# Patient Record
Sex: FEMALE | Race: WHITE | NOT HISPANIC OR LATINO
[De-identification: names, ages, dates, MRNs, and addresses within clinical notes are randomized per-mention and may not be internally consistent; named-entity substitution may affect disease eponyms.]

---

## 2023-05-11 ENCOUNTER — APPOINTMENT (OUTPATIENT)
Dept: INTERNAL MEDICINE | Facility: CLINIC | Age: 68
End: 2023-05-11
Payer: MEDICAID

## 2023-05-11 ENCOUNTER — LABORATORY RESULT (OUTPATIENT)
Age: 68
End: 2023-05-11

## 2023-05-11 ENCOUNTER — NON-APPOINTMENT (OUTPATIENT)
Age: 68
End: 2023-05-11

## 2023-05-11 VITALS
DIASTOLIC BLOOD PRESSURE: 78 MMHG | OXYGEN SATURATION: 98 % | TEMPERATURE: 97.5 F | SYSTOLIC BLOOD PRESSURE: 126 MMHG | HEART RATE: 75 BPM | WEIGHT: 184 LBS | HEIGHT: 62 IN | BODY MASS INDEX: 33.86 KG/M2 | RESPIRATION RATE: 14 BRPM

## 2023-05-11 DIAGNOSIS — Q25.40 CONGENITAL MALFORMATION OF AORTA UNSPECIFIED: ICD-10-CM

## 2023-05-11 DIAGNOSIS — Z80.3 FAMILY HISTORY OF MALIGNANT NEOPLASM OF BREAST: ICD-10-CM

## 2023-05-11 DIAGNOSIS — Z83.3 FAMILY HISTORY OF DIABETES MELLITUS: ICD-10-CM

## 2023-05-11 DIAGNOSIS — Z00.00 ENCOUNTER FOR GENERAL ADULT MEDICAL EXAMINATION W/OUT ABNORMAL FINDINGS: ICD-10-CM

## 2023-05-11 PROCEDURE — 93000 ELECTROCARDIOGRAM COMPLETE: CPT | Mod: 59

## 2023-05-11 PROCEDURE — G0439: CPT

## 2023-05-11 RX ORDER — PIROXICAM 20 MG/1
20 CAPSULE ORAL DAILY
Qty: 30 | Refills: 3 | Status: ACTIVE | COMMUNITY
Start: 1900-01-01 | End: 1900-01-01

## 2023-05-11 RX ORDER — ASPIRIN 81 MG
81 TABLET, DELAYED RELEASE (ENTERIC COATED) ORAL DAILY
Refills: 0 | Status: ACTIVE | COMMUNITY

## 2023-05-11 NOTE — HISTORY OF PRESENT ILLNESS
[FreeTextEntry1] : 66 yo Welsh non-English speaking female accompanied by her bilingual daughter who acted as a  and historian.\par They have brought multiple medications prescribed in [place]. Pt will now be living with her daughter in [place].\par She is feeling well.\par Received one dose of Astrozeneca covid vaccine and suffered PE. [de-identified] : Never a smoker. No alcohol.\par Last mammogram about 10 years ago. Hasn't had a GYN check-up for many years.\par Hasn't had a colonoscopy.\par Doesn't exercise

## 2023-05-11 NOTE — HEALTH RISK ASSESSMENT
[Fair] : ~his/her~ current health as fair  [Good] : ~his/her~  mood as  good [No] : No [Never] : Never [de-identified] : Doesn't exercise

## 2023-05-11 NOTE — PHYSICAL EXAM
[No Acute Distress] : no acute distress [Well Nourished] : well nourished [Well Developed] : well developed [Well-Appearing] : well-appearing [Normal Sclera/Conjunctiva] : normal sclera/conjunctiva [PERRL] : pupils equal round and reactive to light [EOMI] : extraocular movements intact [Normal Outer Ear/Nose] : the outer ears and nose were normal in appearance [Normal Oropharynx] : the oropharynx was normal [No JVD] : no jugular venous distention [No Lymphadenopathy] : no lymphadenopathy [Supple] : supple [No Respiratory Distress] : no respiratory distress  [Thyroid Normal, No Nodules] : the thyroid was normal and there were no nodules present [No Accessory Muscle Use] : no accessory muscle use [Clear to Auscultation] : lungs were clear to auscultation bilaterally [Normal Rate] : normal rate  [Regular Rhythm] : with a regular rhythm [Normal S1, S2] : normal S1 and S2 [No Murmur] : no murmur heard [No Carotid Bruits] : no carotid bruits [No Abdominal Bruit] : a ~M bruit was not heard ~T in the abdomen [No Varicosities] : no varicosities [Pedal Pulses Present] : the pedal pulses are present [No Edema] : there was no peripheral edema [No Palpable Aorta] : no palpable aorta [No Extremity Clubbing/Cyanosis] : no extremity clubbing/cyanosis [Non Tender] : non-tender [Soft] : abdomen soft [Non-distended] : non-distended [No Masses] : no abdominal mass palpated [No HSM] : no HSM [Normal Bowel Sounds] : normal bowel sounds [Normal Posterior Cervical Nodes] : no posterior cervical lymphadenopathy [Normal Anterior Cervical Nodes] : no anterior cervical lymphadenopathy [No CVA Tenderness] : no CVA  tenderness [No Spinal Tenderness] : no spinal tenderness [No Joint Swelling] : no joint swelling [Grossly Normal Strength/Tone] : grossly normal strength/tone [No Rash] : no rash [Coordination Grossly Intact] : coordination grossly intact [No Focal Deficits] : no focal deficits [Normal Gait] : normal gait [Deep Tendon Reflexes (DTR)] : deep tendon reflexes were 2+ and symmetric [Normal Affect] : the affect was normal [Normal Insight/Judgement] : insight and judgment were intact

## 2023-05-11 NOTE — PLAN
[FreeTextEntry1] : See Cardiology\par See Pulmonary\par See Endocrinology\par See GI\par See Orthopedics \par RTO 1 month\par Check labs\par Recommend mammogram and GYN check-up

## 2023-05-18 ENCOUNTER — APPOINTMENT (OUTPATIENT)
Dept: ORTHOPEDIC SURGERY | Facility: CLINIC | Age: 68
End: 2023-05-18
Payer: MEDICAID

## 2023-05-18 ENCOUNTER — NON-APPOINTMENT (OUTPATIENT)
Age: 68
End: 2023-05-18

## 2023-05-18 VITALS — WEIGHT: 184 LBS | HEIGHT: 62 IN | BODY MASS INDEX: 33.86 KG/M2

## 2023-05-18 PROCEDURE — 20610 DRAIN/INJ JOINT/BURSA W/O US: CPT | Mod: 50

## 2023-05-18 PROCEDURE — 99204 OFFICE O/P NEW MOD 45 MIN: CPT | Mod: 25

## 2023-05-18 PROCEDURE — 73564 X-RAY EXAM KNEE 4 OR MORE: CPT | Mod: 50

## 2023-05-18 NOTE — PROCEDURE
[de-identified] : 5/18/2023–bilateral knee injection - Steroid\par The risks, benefits, and alternatives of the injection were reviewed/discussed with the patient today in office and all of their questions were answered. The patient consented to the procedure. The inferolateral injection site was prepped with chloroprep.  Cold spray was utilized to numb the skin. Utilizing sterile technique, the knee was injected with 1 ml 40 mg methylprednisolone, 4 ml 1% Lidocaine, 5 mL 0.25% Bupivicaine. A sterile bandage was applied. The patient tolerated the procedure well and there were no complications.\par

## 2023-05-18 NOTE — HISTORY OF PRESENT ILLNESS
[de-identified] : 5/18/2023–patient presents with bilateral knee pain right worse than left for at least 3 to 4 years.  Pain has been interfering with her daily activities, stairs are very difficult, getting up from a chair is very difficult.  The pain is diffuse in both knees.  Does have buckling locking and swelling.  Ambulates with a cane due to the pain.  Takes Advil and Tylenol for the pain.  Tried physical therapy but he cannot tolerate it due to the pain.  Has not had any other treatments.  Denies allergies, is on aspirin, denies tobacco use.  Past medical significant for diabetes last A1c 6.0 on oral medications, asthma.  Of note patient is going back to [place] in June and will be coming back here in December.  States at that time should be interested in knee replacements.

## 2023-05-18 NOTE — PHYSICAL EXAM
[de-identified] : General Appearance: normal without acute distress\par Mental: Alert and oriented x 3\par Psych/affect: appropriate, cooperative\par Gait: Antalgic gait with cane\par \par Bilateral lower extremity\par Hip: [normal ROM without pain on IR/ER]\par \par Knee\par Inspection: no effusion\par Wounds: [none]\par Alignment: [neutral]\par Palpation: tender to palpation at [medial] and lateral joint line\par ROM active (in degrees): 0-[110] with crepitus/pain through the arc of motion\par Ligamentous laxity: Mild laxity with valgus stress\par Meniscal Test: Deferred, pain with any maneuver\par Muscle Test: good quad strength. [de-identified] : 5/18/2023–bilateral knee xrays, standing AP/Lateral and Merchant films, and 45 degree PA standing view are reviewed and demonstrate diffuse tricompartmental degenerative arthritis, medial joint space narrowing, marginal osteophytes, bone on bone with sclerosis, patellofemoral joint space narrowing

## 2023-05-18 NOTE — DISCUSSION/SUMMARY
[de-identified] : Bilateral knee osteoarthritis\par \par Extensive discussion of the natural history of this disease was had with the patient.  We discussed the treatment options ranging from conservative therapy which includes anti-inflammatories, steroid injections, physical therapy, weight loss, and activity modification.  We did discuss that the ultimate treatment for arthritis is a joint replacement.  Patient would like knee replacements when she returns from [place] in December, for now would like to try conservative treatment.\par -A prescription for meloxicam with the appropriate warnings for GI, cardiac, and renal side effects was given to the patient.  Patient was instructed not to use any other NSAIDs with this medication.\par -Patient elected for bilateral steroid injection today.\par Patient will follow-up as needed if the pain returns or does not improve.

## 2023-05-19 ENCOUNTER — APPOINTMENT (OUTPATIENT)
Dept: OPHTHALMOLOGY | Facility: CLINIC | Age: 68
End: 2023-05-19
Payer: MEDICAID

## 2023-05-19 ENCOUNTER — NON-APPOINTMENT (OUTPATIENT)
Age: 68
End: 2023-05-19

## 2023-05-19 PROCEDURE — 92004 COMPRE OPH EXAM NEW PT 1/>: CPT

## 2023-05-19 PROCEDURE — 76514 ECHO EXAM OF EYE THICKNESS: CPT

## 2023-05-19 PROCEDURE — 92133 CPTRZD OPH DX IMG PST SGM ON: CPT

## 2023-05-22 DIAGNOSIS — M25.561 PAIN IN RIGHT KNEE: ICD-10-CM

## 2023-05-22 DIAGNOSIS — M25.562 PAIN IN RIGHT KNEE: ICD-10-CM

## 2023-06-04 LAB
25(OH)D3 SERPL-MCNC: 29.3 NG/ML
ALBUMIN SERPL ELPH-MCNC: 4.4 G/DL
ALP BLD-CCNC: 65 U/L
ALT SERPL-CCNC: 12 U/L
ANION GAP SERPL CALC-SCNC: 12 MMOL/L
APPEARANCE: CLEAR
AST SERPL-CCNC: 16 U/L
BASOPHILS # BLD AUTO: 0.05 K/UL
BASOPHILS NFR BLD AUTO: 0.8 %
BILIRUB SERPL-MCNC: 0.8 MG/DL
BILIRUBIN URINE: NEGATIVE
BLOOD URINE: NEGATIVE
BUN SERPL-MCNC: 17 MG/DL
CALCIUM SERPL-MCNC: 9.6 MG/DL
CHLORIDE SERPL-SCNC: 103 MMOL/L
CHOLEST SERPL-MCNC: 141 MG/DL
CO2 SERPL-SCNC: 26 MMOL/L
COLOR: YELLOW
CREAT SERPL-MCNC: 0.83 MG/DL
EOSINOPHIL # BLD AUTO: 0.15 K/UL
EOSINOPHIL NFR BLD AUTO: 2.5 %
ESTIMATED AVERAGE GLUCOSE: 126 MG/DL
FOLATE SERPL-MCNC: 12.2 NG/ML
GLUCOSE QUALITATIVE U: >=1000 MG/DL
GLUCOSE SERPL-MCNC: 90 MG/DL
HBA1C MFR BLD HPLC: 6 %
HCT VFR BLD CALC: 37.1 %
HDLC SERPL-MCNC: 63 MG/DL
HGB BLD-MCNC: 11.3 G/DL
IMM GRANULOCYTES NFR BLD AUTO: 0.2 %
KETONES URINE: NEGATIVE MG/DL
LDLC SERPL CALC-MCNC: 64 MG/DL
LEUKOCYTE ESTERASE URINE: NEGATIVE
LYMPHOCYTES # BLD AUTO: 2.66 K/UL
LYMPHOCYTES NFR BLD AUTO: 43.6 %
MAN DIFF?: NORMAL
MCHC RBC-ENTMCNC: 20 PG
MCHC RBC-ENTMCNC: 30.5 GM/DL
MCV RBC AUTO: 65.8 FL
MONOCYTES # BLD AUTO: 0.7 K/UL
MONOCYTES NFR BLD AUTO: 11.5 %
NEUTROPHILS # BLD AUTO: 2.53 K/UL
NEUTROPHILS NFR BLD AUTO: 41.4 %
NITRITE URINE: NEGATIVE
NONHDLC SERPL-MCNC: 78 MG/DL
PH URINE: 6.5
PLATELET # BLD AUTO: 329 K/UL
POTASSIUM SERPL-SCNC: 5 MMOL/L
PROT SERPL-MCNC: 7.3 G/DL
PROTEIN URINE: NEGATIVE MG/DL
RBC # BLD: 5.64 M/UL
RBC # FLD: 22.7 %
SODIUM SERPL-SCNC: 142 MMOL/L
SPECIFIC GRAVITY URINE: 1.03
TRIGL SERPL-MCNC: 73 MG/DL
TSH SERPL-ACNC: 1.48 UIU/ML
UROBILINOGEN URINE: 0.2 MG/DL
VIT B12 SERPL-MCNC: 813 PG/ML
WBC # FLD AUTO: 6.1 K/UL

## 2023-06-08 ENCOUNTER — APPOINTMENT (OUTPATIENT)
Dept: OPHTHALMOLOGY | Facility: CLINIC | Age: 68
End: 2023-06-08
Payer: MEDICAID

## 2023-06-08 ENCOUNTER — NON-APPOINTMENT (OUTPATIENT)
Age: 68
End: 2023-06-08

## 2023-06-08 PROCEDURE — 92083 EXTENDED VISUAL FIELD XM: CPT

## 2023-06-08 PROCEDURE — 92012 INTRM OPH EXAM EST PATIENT: CPT

## 2023-06-13 ENCOUNTER — APPOINTMENT (OUTPATIENT)
Dept: INTERNAL MEDICINE | Facility: CLINIC | Age: 68
End: 2023-06-13
Payer: MEDICAID

## 2023-06-13 VITALS
BODY MASS INDEX: 34.23 KG/M2 | WEIGHT: 186 LBS | RESPIRATION RATE: 14 BRPM | SYSTOLIC BLOOD PRESSURE: 110 MMHG | OXYGEN SATURATION: 98 % | TEMPERATURE: 98.3 F | DIASTOLIC BLOOD PRESSURE: 74 MMHG | HEIGHT: 62 IN | HEART RATE: 58 BPM

## 2023-06-13 PROCEDURE — 99214 OFFICE O/P EST MOD 30 MIN: CPT

## 2023-06-13 NOTE — HISTORY OF PRESENT ILLNESS
[Family Member] : family member [FreeTextEntry1] : Here for follow-up\par Pt is accompanied by her daughter who acted as .\par  [de-identified] : Saw Orthopedist; may need knee replacements in the future\par Saw Ophthalmologist. Being treated for glaucoma and dry eyes\par Has appt with Cardiology, GI. Endocrinology

## 2023-06-13 NOTE — PLAN
[FreeTextEntry1] : Follow up with Ortho\par Has appts with GI, Endocrinology, Cardiology\par Pt will be going back to [place] for a while.\par Follow up here after returning from [place]

## 2023-06-18 LAB
HGB A MFR BLD: 90.9 %
HGB A2 MFR BLD: 2.6 %
HGB F MFR BLD: 6.5 %
HGB FRACT BLD-IMP: NORMAL

## 2023-06-23 ENCOUNTER — APPOINTMENT (OUTPATIENT)
Dept: CARDIOLOGY | Facility: CLINIC | Age: 68
End: 2023-06-23
Payer: MEDICAID

## 2023-06-23 ENCOUNTER — NON-APPOINTMENT (OUTPATIENT)
Age: 68
End: 2023-06-23

## 2023-06-23 VITALS
OXYGEN SATURATION: 98 % | RESPIRATION RATE: 15 BRPM | HEIGHT: 62 IN | SYSTOLIC BLOOD PRESSURE: 102 MMHG | BODY MASS INDEX: 33.13 KG/M2 | DIASTOLIC BLOOD PRESSURE: 68 MMHG | HEART RATE: 70 BPM | WEIGHT: 180 LBS

## 2023-06-23 DIAGNOSIS — I25.10 ATHEROSCLEROTIC HEART DISEASE OF NATIVE CORONARY ARTERY W/OUT ANGINA PECTORIS: ICD-10-CM

## 2023-06-23 DIAGNOSIS — R06.09 OTHER FORMS OF DYSPNEA: ICD-10-CM

## 2023-06-23 PROCEDURE — 99205 OFFICE O/P NEW HI 60 MIN: CPT | Mod: 25

## 2023-06-23 PROCEDURE — 93000 ELECTROCARDIOGRAM COMPLETE: CPT

## 2023-06-23 RX ORDER — EZETIMIBE 10 MG/1
10 TABLET ORAL
Qty: 90 | Refills: 2 | Status: DISCONTINUED | COMMUNITY
Start: 2023-06-23 | End: 2023-06-23

## 2023-06-23 NOTE — HISTORY OF PRESENT ILLNESS
[FreeTextEntry1] : Patient is a 68yo F with obesity, DM, HLD, CAD here for cardiac evaluation. States symptoms started after COVID vaccine. Was having CP/SOB at that time. ? had VTE at that time. NOt very active, liimted by knee pain. Gets some dyspnea on exertion. No CP/pressure. Patient denies PND/orthopnea/edema/palpitations/syncope/claudication. \par \par \par Here with family, originally from  [place], [place]. Lives with daughter.  passed few months ago and now moved to [place].

## 2023-06-23 NOTE — DISCUSSION/SUMMARY
[FreeTextEntry1] : Patient is a 68yo F with obesity, DM, HLD, CAD here for cardiac evaluation. \par -History somewhat unclear regarding if truly has CAD diagnosed at home in [place], on antianginal medication not available in [place]. DAughter states history that sounds more c/w VTE however. \par -HAs cardiac RF and as noted possilble CAD as well as dyspnea and recommend further diagnostic evaluation. Lipids and BP well controlled\par \par \par 1. Echo and 2 day pharm nuclear stress for reasons noted above. limited by knee and unable to walk on treadmill\par 2. Continue ASA for now. Change atorvastatin to 20mg qhs and dc ezetemibe\par 3. Continue torsemide as well for now. No edema on exam or signs CHF at this time\par 4. Recommend aggressive diet and lifestyle modifications \par 5. Diabetes management per PMD. Counselled on diet/weight loss \par 6. Follow up after testing  [EKG obtained to assist in diagnosis and management of assessed problem(s)] : EKG obtained to assist in diagnosis and management of assessed problem(s)

## 2023-06-23 NOTE — PHYSICAL EXAM
[Normal] : clear lung fields, good air entry, no respiratory distress [Soft] : abdomen soft [Non Tender] : non-tender [Moves all extremities] : moves all extremities [Alert and Oriented] : alert and oriented [Abnormal Gait] : abnormal gait [de-identified] : obese  [de-identified] : trace edema, varicosites noted

## 2023-06-27 ENCOUNTER — APPOINTMENT (OUTPATIENT)
Dept: GASTROENTEROLOGY | Facility: CLINIC | Age: 68
End: 2023-06-27
Payer: MEDICAID

## 2023-06-27 VITALS
WEIGHT: 179.2 LBS | DIASTOLIC BLOOD PRESSURE: 72 MMHG | BODY MASS INDEX: 32.97 KG/M2 | HEIGHT: 62 IN | TEMPERATURE: 97.1 F | OXYGEN SATURATION: 99 % | SYSTOLIC BLOOD PRESSURE: 120 MMHG | HEART RATE: 82 BPM

## 2023-06-27 DIAGNOSIS — I20.8 OTHER FORMS OF ANGINA PECTORIS: ICD-10-CM

## 2023-06-27 DIAGNOSIS — K21.9 GASTRO-ESOPHAGEAL REFLUX DISEASE W/OUT ESOPHAGITIS: ICD-10-CM

## 2023-06-27 DIAGNOSIS — K80.20 CALCULUS OF GALLBLADDER W/OUT CHOLECYSTITIS W/OUT OBSTRUCTION: ICD-10-CM

## 2023-06-27 DIAGNOSIS — D64.9 ANEMIA, UNSPECIFIED: ICD-10-CM

## 2023-06-27 DIAGNOSIS — R10.30 LOWER ABDOMINAL PAIN, UNSPECIFIED: ICD-10-CM

## 2023-06-27 DIAGNOSIS — R11.0 NAUSEA: ICD-10-CM

## 2023-06-27 DIAGNOSIS — R10.12 LEFT UPPER QUADRANT PAIN: ICD-10-CM

## 2023-06-27 DIAGNOSIS — R10.13 EPIGASTRIC PAIN: ICD-10-CM

## 2023-06-27 PROCEDURE — 99204 OFFICE O/P NEW MOD 45 MIN: CPT

## 2023-06-27 NOTE — ASSESSMENT
[FreeTextEntry1] : Impression: Chronic upper abdominal pain and nausea possible GERD, likely significant functional component.  Crampy lower abdominal pain most likely IBS.  Reportedly has a large solitary gallstone does not appear to be symptomatic.  None of her current medications have improved her symptoms.\par \par Plan: We will DC all current medications from [place].  Begin pantoprazole 40 mg daily, dicyclomine 20 mg 3 times daily, and begin amitriptyline 10 mg nightly for functional dyspepsia.  Can increase to 20 mg after a week or 2 if no help.  I do not agree with oral dissolution therapy for gallstone as described.  We will obtain sonogram for further information.  Large solitary gallstone with evidence of inflammation would warrant a cholecystectomy, otherwise, if asymptomatic, no action would be necessary.  Patient needs to have EGD and colonoscopy which will be arranged upon her return from [place]

## 2023-06-27 NOTE — HISTORY OF PRESENT ILLNESS
[FreeTextEntry1] : 67-year-old female with a history of type 2 diabetes, asthma and coronary artery disease here to establish care.  Patient is from [place], currently here living with daughter, returning to [place] for the next 6 months next week.  Daughter provides translation.  Patient has been having various GI symptoms for the past 1 year and managed by physicians in [place].  Her primary symptom is severe epigastric and left upper quadrant pain occurring mostly at night awakening her.  Last 30 minutes or so.  She also gets crampy lower abdominal pain off-and-on.  She has occasional nausea but no vomiting.  No dysphagia, odynophagia, early satiety, weight loss, change in bowel habits.  Has not had an endoscopy nor colonoscopy.  Had a sonogram last year which reportedly showed a very large gallstone.  Surgery was not recommended but she was started on an [place] brand of oral dissolution therapy.  She is also been on an [place] brand of dexlansoprazole, and [place] prokinetic that appears to be similar to domperidone which she uses only on occasion, and then [place] General antispasmodic.  She states that none of these medications have improved her symptoms.\par \par Patient also has a microcytic anemia most likely due to a thalassemia trait or other hemoglobinopathy.

## 2023-06-27 NOTE — PHYSICAL EXAM
[Normal] : heart rate was normal and rhythm regular, normal S1 and S2, no murmurs [Bowel Sounds] : normal bowel sounds [No Masses] : no abdominal mass palpated [Abdomen Soft] : soft [] : no hepatosplenomegaly [Diffuse] : diffusely [Epigastric] : in the epigastric area

## 2023-07-12 ENCOUNTER — RX RENEWAL (OUTPATIENT)
Age: 68
End: 2023-07-12

## 2023-07-26 ENCOUNTER — APPOINTMENT (OUTPATIENT)
Dept: CARDIOLOGY | Facility: CLINIC | Age: 68
End: 2023-07-26

## 2023-07-27 ENCOUNTER — APPOINTMENT (OUTPATIENT)
Dept: CARDIOLOGY | Facility: CLINIC | Age: 68
End: 2023-07-27

## 2023-08-02 ENCOUNTER — APPOINTMENT (OUTPATIENT)
Dept: CARDIOLOGY | Facility: CLINIC | Age: 68
End: 2023-08-02

## 2023-08-10 ENCOUNTER — RX RENEWAL (OUTPATIENT)
Age: 68
End: 2023-08-10

## 2023-08-22 ENCOUNTER — APPOINTMENT (OUTPATIENT)
Dept: CARDIOLOGY | Facility: CLINIC | Age: 68
End: 2023-08-22

## 2023-09-01 ENCOUNTER — RX RENEWAL (OUTPATIENT)
Age: 68
End: 2023-09-01

## 2023-09-07 ENCOUNTER — RX RENEWAL (OUTPATIENT)
Age: 68
End: 2023-09-07

## 2023-10-26 ENCOUNTER — NON-APPOINTMENT (OUTPATIENT)
Age: 68
End: 2023-10-26

## 2023-11-01 ENCOUNTER — APPOINTMENT (OUTPATIENT)
Dept: ENDOCRINOLOGY | Facility: CLINIC | Age: 68
End: 2023-11-01
Payer: MEDICAID

## 2023-11-01 DIAGNOSIS — Z13.820 ENCOUNTER FOR SCREENING FOR OSTEOPOROSIS: ICD-10-CM

## 2023-11-01 DIAGNOSIS — E66.09 OTHER OBESITY DUE TO EXCESS CALORIES: ICD-10-CM

## 2023-11-01 PROCEDURE — 99205 OFFICE O/P NEW HI 60 MIN: CPT | Mod: 25

## 2023-11-01 RX ORDER — SITAGLIPTIN 100 MG/1
100 TABLET, FILM COATED ORAL
Qty: 30 | Refills: 3 | Status: DISCONTINUED | COMMUNITY
End: 2023-11-01

## 2023-11-09 LAB
CREAT SPEC-SCNC: 84 MG/DL
ESTIMATED AVERAGE GLUCOSE: 123 MG/DL
HBA1C MFR BLD HPLC: 5.9 %
MICROALBUMIN 24H UR DL<=1MG/L-MCNC: <1.2 MG/DL
MICROALBUMIN/CREAT 24H UR-RTO: NORMAL MG/G
THYROGLOB AB SERPL-ACNC: <1 IU/ML
THYROPEROXIDASE AB SERPL IA-ACNC: 14 IU/ML
TSH SERPL-ACNC: 2.65 UIU/ML

## 2023-11-13 ENCOUNTER — APPOINTMENT (OUTPATIENT)
Dept: ORTHOPEDIC SURGERY | Facility: CLINIC | Age: 68
End: 2023-11-13
Payer: MEDICAID

## 2023-11-15 ENCOUNTER — RX RENEWAL (OUTPATIENT)
Age: 68
End: 2023-11-15

## 2023-11-16 ENCOUNTER — RX RENEWAL (OUTPATIENT)
Age: 68
End: 2023-11-16

## 2023-11-20 ENCOUNTER — APPOINTMENT (OUTPATIENT)
Dept: INTERNAL MEDICINE | Facility: CLINIC | Age: 68
End: 2023-11-20
Payer: MEDICAID

## 2023-11-20 ENCOUNTER — APPOINTMENT (OUTPATIENT)
Dept: ORTHOPEDIC SURGERY | Facility: CLINIC | Age: 68
End: 2023-11-20
Payer: MEDICAID

## 2023-11-20 VITALS
SYSTOLIC BLOOD PRESSURE: 120 MMHG | BODY MASS INDEX: 34.23 KG/M2 | TEMPERATURE: 97.8 F | HEART RATE: 69 BPM | DIASTOLIC BLOOD PRESSURE: 70 MMHG | WEIGHT: 186 LBS | RESPIRATION RATE: 16 BRPM | OXYGEN SATURATION: 96 % | HEIGHT: 62 IN

## 2023-11-20 VITALS
DIASTOLIC BLOOD PRESSURE: 80 MMHG | BODY MASS INDEX: 32.94 KG/M2 | SYSTOLIC BLOOD PRESSURE: 136 MMHG | WEIGHT: 179 LBS | HEART RATE: 80 BPM | HEIGHT: 62 IN

## 2023-11-20 DIAGNOSIS — S59.909A UNSPECIFIED INJURY OF UNSPECIFIED ELBOW, INITIAL ENCOUNTER: ICD-10-CM

## 2023-11-20 DIAGNOSIS — M15.9 POLYOSTEOARTHRITIS, UNSPECIFIED: ICD-10-CM

## 2023-11-20 DIAGNOSIS — R10.13 EPIGASTRIC PAIN: ICD-10-CM

## 2023-11-20 PROCEDURE — 99214 OFFICE O/P EST MOD 30 MIN: CPT

## 2023-11-20 PROCEDURE — 73564 X-RAY EXAM KNEE 4 OR MORE: CPT | Mod: 50

## 2023-11-20 RX ORDER — TORSEMIDE 5 MG/1
5 TABLET ORAL DAILY
Qty: 90 | Refills: 1 | Status: ACTIVE | COMMUNITY
Start: 1900-01-01 | End: 1900-01-01

## 2023-11-20 RX ORDER — LEVOTHYROXINE SODIUM 0.05 MG/1
50 TABLET ORAL
Qty: 30 | Refills: 3 | Status: ACTIVE | COMMUNITY
Start: 2023-09-01 | End: 1900-01-01

## 2023-11-20 RX ORDER — AMITRIPTYLINE HYDROCHLORIDE 10 MG/1
10 TABLET, FILM COATED ORAL
Qty: 180 | Refills: 3 | Status: ACTIVE | COMMUNITY
Start: 2023-06-27 | End: 1900-01-01

## 2023-11-20 RX ORDER — DICYCLOMINE HYDROCHLORIDE 20 MG/1
20 TABLET ORAL
Qty: 270 | Refills: 3 | Status: ACTIVE | COMMUNITY
Start: 2023-06-27 | End: 1900-01-01

## 2023-11-20 RX ORDER — PANTOPRAZOLE 40 MG/1
40 TABLET, DELAYED RELEASE ORAL
Qty: 90 | Refills: 3 | Status: ACTIVE | COMMUNITY
Start: 1900-01-01 | End: 1900-01-01

## 2023-11-29 ENCOUNTER — OUTPATIENT (OUTPATIENT)
Dept: OUTPATIENT SERVICES | Facility: HOSPITAL | Age: 68
LOS: 1 days | End: 2023-11-29
Payer: MEDICAID

## 2023-11-29 ENCOUNTER — APPOINTMENT (OUTPATIENT)
Dept: MRI IMAGING | Facility: CLINIC | Age: 68
End: 2023-11-29
Payer: MEDICAID

## 2023-11-29 DIAGNOSIS — M17.0 BILATERAL PRIMARY OSTEOARTHRITIS OF KNEE: ICD-10-CM

## 2023-11-29 PROCEDURE — 73721 MRI JNT OF LWR EXTRE W/O DYE: CPT

## 2023-11-29 PROCEDURE — 73721 MRI JNT OF LWR EXTRE W/O DYE: CPT | Mod: 26,LT

## 2023-12-15 ENCOUNTER — APPOINTMENT (OUTPATIENT)
Dept: OPHTHALMOLOGY | Facility: CLINIC | Age: 68
End: 2023-12-15
Payer: MEDICAID

## 2023-12-15 ENCOUNTER — NON-APPOINTMENT (OUTPATIENT)
Age: 68
End: 2023-12-15

## 2023-12-15 PROCEDURE — 92133 CPTRZD OPH DX IMG PST SGM ON: CPT

## 2023-12-15 PROCEDURE — 92012 INTRM OPH EXAM EST PATIENT: CPT

## 2024-01-03 ENCOUNTER — APPOINTMENT (OUTPATIENT)
Dept: CARDIOLOGY | Facility: CLINIC | Age: 69
End: 2024-01-03
Payer: MEDICAID

## 2024-01-03 PROCEDURE — 93015 CV STRESS TEST SUPVJ I&R: CPT

## 2024-01-03 PROCEDURE — A9500: CPT

## 2024-01-03 PROCEDURE — 78452 HT MUSCLE IMAGE SPECT MULT: CPT

## 2024-01-03 RX ADMIN — AMINOPHYLLINE 3 MG/ML: 25 INJECTION, SOLUTION INTRAVENOUS at 00:00

## 2024-01-04 ENCOUNTER — APPOINTMENT (OUTPATIENT)
Dept: CARDIOLOGY | Facility: CLINIC | Age: 69
End: 2024-01-04

## 2024-01-04 RX ORDER — AMINOPHYLLINE 25 MG/ML
25 INJECTION, SOLUTION INTRAVENOUS
Qty: 0 | Refills: 0 | Status: COMPLETED | OUTPATIENT
Start: 2024-01-03

## 2024-01-12 ENCOUNTER — APPOINTMENT (OUTPATIENT)
Dept: INTERNAL MEDICINE | Facility: CLINIC | Age: 69
End: 2024-01-12
Payer: MEDICAID

## 2024-01-12 VITALS
DIASTOLIC BLOOD PRESSURE: 72 MMHG | HEART RATE: 77 BPM | OXYGEN SATURATION: 95 % | RESPIRATION RATE: 14 BRPM | HEIGHT: 62 IN | TEMPERATURE: 98.2 F | WEIGHT: 183 LBS | BODY MASS INDEX: 33.68 KG/M2 | SYSTOLIC BLOOD PRESSURE: 120 MMHG

## 2024-01-12 DIAGNOSIS — M17.0 BILATERAL PRIMARY OSTEOARTHRITIS OF KNEE: ICD-10-CM

## 2024-01-12 DIAGNOSIS — Z01.818 ENCOUNTER FOR OTHER PREPROCEDURAL EXAMINATION: ICD-10-CM

## 2024-01-12 PROCEDURE — 99214 OFFICE O/P EST MOD 30 MIN: CPT

## 2024-01-16 ENCOUNTER — APPOINTMENT (OUTPATIENT)
Dept: INTERNAL MEDICINE | Facility: CLINIC | Age: 69
End: 2024-01-16
Payer: MEDICAID

## 2024-01-16 VITALS
HEIGHT: 58 IN | SYSTOLIC BLOOD PRESSURE: 101 MMHG | OXYGEN SATURATION: 97 % | HEART RATE: 74 BPM | BODY MASS INDEX: 38.62 KG/M2 | TEMPERATURE: 97.7 F | RESPIRATION RATE: 16 BRPM | WEIGHT: 184 LBS | DIASTOLIC BLOOD PRESSURE: 68 MMHG

## 2024-01-16 DIAGNOSIS — G47.9 SLEEP DISORDER, UNSPECIFIED: ICD-10-CM

## 2024-01-16 DIAGNOSIS — J45.909 UNSPECIFIED ASTHMA, UNCOMPLICATED: ICD-10-CM

## 2024-01-16 DIAGNOSIS — R94.2 ABNORMAL RESULTS OF PULMONARY FUNCTION STUDIES: ICD-10-CM

## 2024-01-16 PROCEDURE — 94729 DIFFUSING CAPACITY: CPT

## 2024-01-16 PROCEDURE — 94060 EVALUATION OF WHEEZING: CPT

## 2024-01-16 PROCEDURE — 94727 GAS DIL/WSHOT DETER LNG VOL: CPT

## 2024-01-16 PROCEDURE — 99205 OFFICE O/P NEW HI 60 MIN: CPT | Mod: 25

## 2024-01-16 RX ORDER — BECLOMETHASONE DIPROPIONATE HFA 80 UG/1
80 AEROSOL, METERED RESPIRATORY (INHALATION) TWICE DAILY
Qty: 1 | Refills: 3 | Status: ACTIVE | COMMUNITY
Start: 2024-01-16 | End: 1900-01-01

## 2024-01-16 NOTE — PHYSICAL EXAM
[No Acute Distress] : no acute distress [Normal Oropharynx] : normal oropharynx [IV] : Mallampati Class: IV [Normal Appearance] : normal appearance [No Neck Mass] : no neck mass [Normal Rate/Rhythm] : normal rate/rhythm [Normal S1, S2] : normal s1, s2 [No Murmurs] : no murmurs [No Resp Distress] : no resp distress [Clear to Auscultation Bilaterally] : clear to auscultation bilaterally [No Abnormalities] : no abnormalities [Normal Gait] : normal gait [No Clubbing] : no clubbing [No Cyanosis] : no cyanosis [No Edema] : no edema [Normal Color/ Pigmentation] : normal color/ pigmentation [No Focal Deficits] : no focal deficits [Oriented x3] : oriented x3 [Normal Affect] : normal affect [TextBox_2] : obese

## 2024-01-16 NOTE — RESULTS/DATA
[TextEntry] :  PFT 01/16/2024 Spirometry (FEV1):  99 (%predicted) Ratio: 107 Lung volumes (TLC):   154 (% predicted) Diffusion capacity (DLCO):  63 (% predicted) No significant bronchodilator response   Interpretation: Moderately diminished diffusion capacity which corrected for alveolar ventilation. otherwise normal PFT.

## 2024-01-16 NOTE — HISTORY OF PRESENT ILLNESS
[TextBox_4] : The patient, Misty Loyd, presents with a history of pulmonary embolism following the administration of the AstraZeneca vaccine in 2020. She was initially placed on a "strong blood thinner" for four months and then switched to baby aspirin. As per patient she was followed by D-dimers. She did not have a follow up CT scan as far as the patient can tell. The patient is concerned about the need for continued blood thinner use, as she is scheduled for a knee replacement surgery at the end of the month.  Ms. Loyd also has a history of asthma, which began in 2018. Her symptoms are seasonal, occurring during the onset of spring and winter. She experiences shortness of breath with minimal exertion, such as cooking or walking in the backyard. She has previously undergone a stress test and an echocardiogram, both of which returned normal results. The patient has no history of smoking, vaping, or marijuana use. She has been using a medication from [place] which is a combination of three agents.   The patient is scheduled for knee surgery on January 31st.   Ms. Loyd has a history of an allergic reaction to contrast dye used in a previous CT scan, which caused swelling throughout her body.   She denies snoring.

## 2024-01-16 NOTE — REVIEW OF SYSTEMS
[Fatigue] : fatigue [Poor Appetite] : no poor appetite [Nasal Congestion] : nasal congestion [Cough] : no cough [Hemoptysis] : no hemoptysis [Chest Tightness] : no chest tightness [Sputum] : no sputum [Dyspnea] : no dyspnea [Pleuritic Pain] : no pleuritic pain [Wheezing] : no wheezing [A.M. Dry Mouth] : no a.m. dry mouth [SOB on Exertion] : sob on exertion [Seasonal Allergies] : seasonal allergies [Arthralgias] : arthralgias [Myalgias] : myalgias [Negative] : Psychiatric

## 2024-01-16 NOTE — PLAN
[TextEntry] : 1. History of pulmonary embolism (PE) post-AstraZeneca vaccine: - The patient had a PE in 2020 after receiving the first dose of the AstraZeneca vaccine. She was initially on a "strong blood thinner" for four months and then switched to baby aspirin.  The patient wanted discussion on when to stop the aspirin prior to the surgery. I discussed with her that in clinical studies aspirin by itself has very limited potential in preventing PE. Thus it can be discontinued at the surgeon's discretion.   I wanted to assess if the clots have resolved by a CTA. However the patient reported that she has severe dye allergy where she has episodes of diffuse swelling. Hence I did not order a CT angiogram.   2. Asthma (adult onset): - The patient has a history of asthma since 2018, with seasonal exacerbations. Her current PFT showed good pulmonary function. I switched her to Qvar from the inhaler from [place].  3. Upcoming knee surgery: - The patient is scheduled for knee surgery on the 31st. She would need clost monitoring for any symptoms for PE.  - early ambulation is key. - if unable to ambulate, i would recommend prophylactic anticoagulation/ SCDs as much as possible.  - incentive spirometry.  - may need bipap in perioperative phase.   4. Possible Sleep apnea: - A sleep study is recommended due to the patient's crowded airway, which may be causing sleep apnea. The patient can complete the sleep study at a convenient time.  5. reduced DLCO in CT scan - A CT scan of the chest is ordered to rule out any underlying interstitial lung disease. Unfortunately chest Xray will not be able to provide this information.  - She had an ECHO done a few weeks ago and apparently it was " okay".   F/u in 2 months or sooner as needed.

## 2024-01-23 ENCOUNTER — OUTPATIENT (OUTPATIENT)
Dept: OUTPATIENT SERVICES | Facility: HOSPITAL | Age: 69
LOS: 1 days | End: 2024-01-23
Payer: MEDICAID

## 2024-01-23 ENCOUNTER — RESULT REVIEW (OUTPATIENT)
Age: 69
End: 2024-01-23

## 2024-01-23 VITALS
DIASTOLIC BLOOD PRESSURE: 44 MMHG | TEMPERATURE: 98 F | HEIGHT: 58 IN | WEIGHT: 187.39 LBS | RESPIRATION RATE: 18 BRPM | HEART RATE: 63 BPM | SYSTOLIC BLOOD PRESSURE: 113 MMHG | OXYGEN SATURATION: 99 %

## 2024-01-23 DIAGNOSIS — Z90.721 ACQUIRED ABSENCE OF OVARIES, UNILATERAL: Chronic | ICD-10-CM

## 2024-01-23 DIAGNOSIS — Z01.818 ENCOUNTER FOR OTHER PREPROCEDURAL EXAMINATION: ICD-10-CM

## 2024-01-23 DIAGNOSIS — M17.0 BILATERAL PRIMARY OSTEOARTHRITIS OF KNEE: ICD-10-CM

## 2024-01-23 DIAGNOSIS — Z29.9 ENCOUNTER FOR PROPHYLACTIC MEASURES, UNSPECIFIED: ICD-10-CM

## 2024-01-23 LAB
A1C WITH ESTIMATED AVERAGE GLUCOSE RESULT: 5.7 % — HIGH (ref 4–5.6)
ALBUMIN SERPL ELPH-MCNC: 3.3 G/DL — SIGNIFICANT CHANGE UP (ref 3.3–5)
ALLERGY+IMMUNOLOGY DIAG STUDY NOTE: SIGNIFICANT CHANGE UP
ALLERGY+IMMUNOLOGY DIAG STUDY NOTE: SIGNIFICANT CHANGE UP
ANION GAP SERPL CALC-SCNC: 3 MMOL/L — LOW (ref 5–17)
ANISOCYTOSIS BLD QL: SLIGHT — SIGNIFICANT CHANGE UP
APTT BLD: 30.7 SEC — SIGNIFICANT CHANGE UP (ref 24.5–35.6)
BASO STIPL BLD QL SMEAR: PRESENT — SIGNIFICANT CHANGE UP
BASOPHILS # BLD AUTO: 0.04 K/UL — SIGNIFICANT CHANGE UP (ref 0–0.2)
BASOPHILS NFR BLD AUTO: 0.8 % — SIGNIFICANT CHANGE UP (ref 0–2)
BLD GP AB SCN SERPL QL: SIGNIFICANT CHANGE UP
BUN SERPL-MCNC: 25 MG/DL — HIGH (ref 7–23)
CALCIUM SERPL-MCNC: 9 MG/DL — SIGNIFICANT CHANGE UP (ref 8.5–10.1)
CHLORIDE SERPL-SCNC: 109 MMOL/L — HIGH (ref 96–108)
CO2 SERPL-SCNC: 26 MMOL/L — SIGNIFICANT CHANGE UP (ref 22–31)
CREAT SERPL-MCNC: 0.84 MG/DL — SIGNIFICANT CHANGE UP (ref 0.5–1.3)
DAT C3-SP REAG RBC QL: SIGNIFICANT CHANGE UP
DAT IGG-SP REAG RBC-IMP: ABNORMAL
DIR ANTIGLOB POLYSPECIFIC INTERPRETATION: ABNORMAL
EGFR: 77 ML/MIN/1.73M2 — SIGNIFICANT CHANGE UP
ELLIPTOCYTES BLD QL SMEAR: SLIGHT — SIGNIFICANT CHANGE UP
EOSINOPHIL # BLD AUTO: 0.17 K/UL — SIGNIFICANT CHANGE UP (ref 0–0.5)
EOSINOPHIL NFR BLD AUTO: 3.3 % — SIGNIFICANT CHANGE UP (ref 0–6)
ESTIMATED AVERAGE GLUCOSE: 117 MG/DL — HIGH (ref 68–114)
GLUCOSE SERPL-MCNC: 86 MG/DL — SIGNIFICANT CHANGE UP (ref 70–99)
HCT VFR BLD CALC: 32.8 % — LOW (ref 34.5–45)
HGB BLD-MCNC: 10.7 G/DL — LOW (ref 11.5–15.5)
IMM GRANULOCYTES NFR BLD AUTO: 0.2 % — SIGNIFICANT CHANGE UP (ref 0–0.9)
INR BLD: 0.88 RATIO — SIGNIFICANT CHANGE UP (ref 0.85–1.18)
LYMPHOCYTES # BLD AUTO: 1.98 K/UL — SIGNIFICANT CHANGE UP (ref 1–3.3)
LYMPHOCYTES # BLD AUTO: 38 % — SIGNIFICANT CHANGE UP (ref 13–44)
MANUAL SMEAR VERIFICATION: SIGNIFICANT CHANGE UP
MCHC RBC-ENTMCNC: 20.8 PG — LOW (ref 27–34)
MCHC RBC-ENTMCNC: 32.6 GM/DL — SIGNIFICANT CHANGE UP (ref 32–36)
MCV RBC AUTO: 63.8 FL — LOW (ref 80–100)
MICROCYTES BLD QL: SLIGHT — SIGNIFICANT CHANGE UP
MONOCYTES # BLD AUTO: 0.57 K/UL — SIGNIFICANT CHANGE UP (ref 0–0.9)
MONOCYTES NFR BLD AUTO: 10.9 % — SIGNIFICANT CHANGE UP (ref 2–14)
MRSA PCR RESULT.: SIGNIFICANT CHANGE UP
NEUTROPHILS # BLD AUTO: 2.44 K/UL — SIGNIFICANT CHANGE UP (ref 1.8–7.4)
NEUTROPHILS NFR BLD AUTO: 46.8 % — SIGNIFICANT CHANGE UP (ref 43–77)
PLAT MORPH BLD: NORMAL — SIGNIFICANT CHANGE UP
PLATELET # BLD AUTO: 272 K/UL — SIGNIFICANT CHANGE UP (ref 150–400)
POIKILOCYTOSIS BLD QL AUTO: SIGNIFICANT CHANGE UP
POLYCHROMASIA BLD QL SMEAR: SLIGHT — SIGNIFICANT CHANGE UP
POTASSIUM SERPL-MCNC: 4.3 MMOL/L — SIGNIFICANT CHANGE UP (ref 3.5–5.3)
POTASSIUM SERPL-SCNC: 4.3 MMOL/L — SIGNIFICANT CHANGE UP (ref 3.5–5.3)
PROTHROM AB SERPL-ACNC: 10 SEC — SIGNIFICANT CHANGE UP (ref 9.5–13)
RBC # BLD: 5.14 M/UL — SIGNIFICANT CHANGE UP (ref 3.8–5.2)
RBC # FLD: 21.5 % — HIGH (ref 10.3–14.5)
RBC BLD AUTO: ABNORMAL
S AUREUS DNA NOSE QL NAA+PROBE: SIGNIFICANT CHANGE UP
SCHISTOCYTES BLD QL AUTO: SLIGHT — SIGNIFICANT CHANGE UP
SODIUM SERPL-SCNC: 138 MMOL/L — SIGNIFICANT CHANGE UP (ref 135–145)
WBC # BLD: 5.21 K/UL — SIGNIFICANT CHANGE UP (ref 3.8–10.5)
WBC # FLD AUTO: 5.21 K/UL — SIGNIFICANT CHANGE UP (ref 3.8–10.5)

## 2024-01-23 PROCEDURE — 87640 STAPH A DNA AMP PROBE: CPT

## 2024-01-23 PROCEDURE — 82040 ASSAY OF SERUM ALBUMIN: CPT

## 2024-01-23 PROCEDURE — 86905 BLOOD TYPING RBC ANTIGENS: CPT

## 2024-01-23 PROCEDURE — 36415 COLL VENOUS BLD VENIPUNCTURE: CPT

## 2024-01-23 PROCEDURE — 86922 COMPATIBILITY TEST ANTIGLOB: CPT

## 2024-01-23 PROCEDURE — 86902 BLOOD TYPE ANTIGEN DONOR EA: CPT

## 2024-01-23 PROCEDURE — 85730 THROMBOPLASTIN TIME PARTIAL: CPT

## 2024-01-23 PROCEDURE — 86860 RBC ANTIBODY ELUTION: CPT

## 2024-01-23 PROCEDURE — 86077 PHYS BLOOD BANK SERV XMATCH: CPT

## 2024-01-23 PROCEDURE — 85025 COMPLETE CBC W/AUTO DIFF WBC: CPT

## 2024-01-23 PROCEDURE — 86921 COMPATIBILITY TEST INCUBATE: CPT

## 2024-01-23 PROCEDURE — 86870 RBC ANTIBODY IDENTIFICATION: CPT

## 2024-01-23 PROCEDURE — 93010 ELECTROCARDIOGRAM REPORT: CPT

## 2024-01-23 PROCEDURE — 86900 BLOOD TYPING SEROLOGIC ABO: CPT

## 2024-01-23 PROCEDURE — 86880 COOMBS TEST DIRECT: CPT

## 2024-01-23 PROCEDURE — 71046 X-RAY EXAM CHEST 2 VIEWS: CPT | Mod: 26

## 2024-01-23 PROCEDURE — 80048 BASIC METABOLIC PNL TOTAL CA: CPT

## 2024-01-23 PROCEDURE — 83036 HEMOGLOBIN GLYCOSYLATED A1C: CPT

## 2024-01-23 PROCEDURE — 85610 PROTHROMBIN TIME: CPT

## 2024-01-23 PROCEDURE — 87641 MR-STAPH DNA AMP PROBE: CPT

## 2024-01-23 PROCEDURE — 71046 X-RAY EXAM CHEST 2 VIEWS: CPT

## 2024-01-23 PROCEDURE — 86920 COMPATIBILITY TEST SPIN: CPT

## 2024-01-23 PROCEDURE — 86901 BLOOD TYPING SEROLOGIC RH(D): CPT

## 2024-01-23 PROCEDURE — 93005 ELECTROCARDIOGRAM TRACING: CPT

## 2024-01-23 PROCEDURE — 86850 RBC ANTIBODY SCREEN: CPT

## 2024-01-23 PROCEDURE — 99214 OFFICE O/P EST MOD 30 MIN: CPT | Mod: 25

## 2024-01-23 NOTE — H&P PST ADULT - CARDIOVASCULAR COMMENTS
BLE +1 edema, non pitting preop cardiac evaluation done with ALTHEA Huston for optimization for surgery

## 2024-01-23 NOTE — H&P PST ADULT - NSICDXPASTMEDICALHX_GEN_ALL_CORE_FT
PAST MEDICAL HISTORY:  Arthritis     Glaucoma     Hyperlipidemia     Lower extremity edema     Seasonal asthma     Sleep apnea     Stomach ulcer     Type II diabetes mellitus      PAST MEDICAL HISTORY:  Arthritis     Glaucoma     Hyperlipidemia     Hypothyroid     Lower extremity edema     Pulmonary embolism     Seasonal asthma     Sleep apnea     Stomach ulcer     Type II diabetes mellitus

## 2024-01-23 NOTE — H&P PST ADULT - HEMATOLOGY/LYMPHATICS COMMENTS
hx PE, 2020 after receiving ExSafe  Covid vaccine in [place]--was treated, remains on low dose Aspirin

## 2024-01-23 NOTE — H&P PST ADULT - HISTORY OF PRESENT ILLNESS
66  68 y.o WD, WN obese Korean speaking female presents to PST with hx of right knee pain. Daughter Naomi in attendance for translation per patient request. Patient c/o fo bilateral knee pain ,, right worse than left for years. She has treated her pain conservatively with temporary relief. Her pain has progressively worsened with diagnostics indicating advanced arthritis. Her hx is significant for DM II, Sleep Apnea, PE , Hypothyroid and Hyperlipidemia. She is now scheduled for a Right Total Knee Replacement

## 2024-01-23 NOTE — H&P PST ADULT - ENDOCRINE COMMENTS
DM II, checks FS daily, average 160-170s, reports last A1C 5.9, Hypothyroid stable--managed by endocrinology

## 2024-01-23 NOTE — H&P PST ADULT - ASSESSMENT
[age] y.o female scheduled for  Right Total Knee Replacement   Plan  1. Stop all NSAIDS, herbal supplements and vitamins for 7 days. ASA per cardiology directions   2. NPO at midnight.  3. Take the following medications Levothyroxine, Pantoprazole  with small sips of water on the morning of your procedure/surgery.  4. Use EZ sponges as directed  5. Use mupirocin as directed  6. Labs, EKG, CXR, MRSA as per surgeon  7. PMD TATIANNA Huston cardiologist visit for optimization prior to surgery as per surgeon.  8. Joint class--will attend 2024   9. Patient john require a rolling walker at home due to their dx of arthritis to help complete the MRADL's   10. Preprocedure education provided     CAPRINI SCORE    AGE RELATED RISK FACTORS                                                       MOBILITY RELATED FACTORS  [ ] Age 41-60 years                                            (1 Point)                  [ ] Bed rest                                                        (1 Point)  [x ] Age: 61-74 years                                           (2 Points)                [ ] Plaster cast                                                   (2 Points)  [ ] Age= 75 years                                              (3 Points)                 [ ] Bed bound for more than 72 hours                   (2 Points)    DISEASE RELATED RISK FACTORS                                               GENDER SPECIFIC FACTORS  [x ] Edema in the lower extremities                       (1 Point)                  [ ] Pregnancy                                                     (1 Point)  [ ] Varicose veins                                               (1 Point)                  [ ] Post-partum < 6 weeks                                   (1 Point)             [x ] BMI > 25 Kg/m2                                            (1 Point)                  [ ] Hormonal therapy  or oral contraception            (1 Point)                 [ ] Sepsis (in the previous month)                        (1 Point)                  [ ] History of pregnancy complications  [ ] Pneumonia or serious lung disease                                               [ ] Unexplained or recurrent                       (1 Point)           (in the previous month)                               (1 Point)  [ ] Abnormal pulmonary function test                     (1 Point)                 SURGERY RELATED RISK FACTORS  [ ] Acute myocardial infarction                              (1 Point)                 [ ]  Section                                            (1 Point)  [ ] Congestive heart failure (in the previous month)  (1 Point)                 [ ] Minor surgery                                                 (1 Point)   [ ] Inflammatory bowel disease                             (1 Point)                 [ ] Arthroscopic surgery                                        (2 Points)  [ ] Central venous access                                    (2 Points)                [ ] General surgery lasting more than 45 minutes   (2 Points)       [ ] Stroke (in the previous month)                          (5 Points)               [x ] Elective arthroplasty                                        (5 Points)                                                                                                                                               HEMATOLOGY RELATED FACTORS                                                 TRAUMA RELATED RISK FACTORS  [x ] Prior episodes of VTE                                     (3 Points)                 [ ] Fracture of the hip, pelvis, or leg                       (5 Points)  [ ] Positive family history for VTE                         (3 Points)                 [ ] Acute spinal cord injury (in the previous month)  (5 Points)  [ ] Prothrombin 71844 A                                      (3 Points)                 [ ] Paralysis  (less than 1 month)                          (5 Points)  [ ] Factor V Leiden                                             (3 Points)                 [ ] Multiple Trauma within 1 month                         (5 Points)  [ ] Lupus anticoagulants                                     (3 Points)                                                           [ ] Anticardiolipin antibodies                                (3 Points)                                                       [ ] High homocysteine in the blood                      (3 Points)                                             [ ] Other congenital or acquired thrombophilia       (3 Points)                                                [ ] Heparin induced thrombocytopenia                  (3 Points)                                          Total Score [    12      ]    The Caprini score indicates that this patient is at high risk for a VTE event (score > = 6).    Surgical patients in this group will benefit from both pharmacologic prophylaxis and intermittent compression devices.    The surgical team will determine the balance between VTE risk and bleeding risk, and other clinical considerations.

## 2024-01-23 NOTE — H&P PST ADULT - PAIN ASSESSMENT COMPLETED
Pt self directed on unit and was compliant with medications and donning of mask when out on the unit. Pt denies current suicidal ideation, does not endorse plan/intent. Pt denies current homicidal ideation  does not endorse plan/intent. Pt denies auditory and visual hallucinations. Pt progressing through alcohol detox (see flow sheet). Pt agrees to notify staff of any safety concerns during shift. Will continue to monitor and support. Yes

## 2024-01-24 DIAGNOSIS — M17.0 BILATERAL PRIMARY OSTEOARTHRITIS OF KNEE: ICD-10-CM

## 2024-01-24 DIAGNOSIS — Z01.818 ENCOUNTER FOR OTHER PREPROCEDURAL EXAMINATION: ICD-10-CM

## 2024-01-26 ENCOUNTER — APPOINTMENT (OUTPATIENT)
Dept: CARDIOLOGY | Facility: CLINIC | Age: 69
End: 2024-01-26
Payer: MEDICAID

## 2024-01-26 ENCOUNTER — APPOINTMENT (OUTPATIENT)
Dept: ENDOCRINOLOGY | Facility: CLINIC | Age: 69
End: 2024-01-26
Payer: MEDICAID

## 2024-01-26 ENCOUNTER — NON-APPOINTMENT (OUTPATIENT)
Age: 69
End: 2024-01-26

## 2024-01-26 VITALS
RESPIRATION RATE: 16 BRPM | HEART RATE: 66 BPM | HEIGHT: 58 IN | BODY MASS INDEX: 39.25 KG/M2 | WEIGHT: 187 LBS | SYSTOLIC BLOOD PRESSURE: 110 MMHG | DIASTOLIC BLOOD PRESSURE: 74 MMHG

## 2024-01-26 DIAGNOSIS — E78.2 MIXED HYPERLIPIDEMIA: ICD-10-CM

## 2024-01-26 DIAGNOSIS — E03.9 HYPOTHYROIDISM, UNSPECIFIED: ICD-10-CM

## 2024-01-26 DIAGNOSIS — E66.9 OBESITY, UNSPECIFIED: ICD-10-CM

## 2024-01-26 DIAGNOSIS — R94.31 ABNORMAL ELECTROCARDIOGRAM [ECG] [EKG]: ICD-10-CM

## 2024-01-26 DIAGNOSIS — Z86.711 PERSONAL HISTORY OF PULMONARY EMBOLISM: ICD-10-CM

## 2024-01-26 DIAGNOSIS — Z01.810 ENCOUNTER FOR PREPROCEDURAL CARDIOVASCULAR EXAMINATION: ICD-10-CM

## 2024-01-26 DIAGNOSIS — E11.9 TYPE 2 DIABETES MELLITUS W/OUT COMPLICATIONS: ICD-10-CM

## 2024-01-26 PROBLEM — E78.5 HYPERLIPIDEMIA, UNSPECIFIED: Chronic | Status: ACTIVE | Noted: 2024-01-23

## 2024-01-26 PROBLEM — I26.99 OTHER PULMONARY EMBOLISM WITHOUT ACUTE COR PULMONALE: Chronic | Status: ACTIVE | Noted: 2024-01-23

## 2024-01-26 PROBLEM — R60.0 LOCALIZED EDEMA: Chronic | Status: ACTIVE | Noted: 2024-01-23

## 2024-01-26 PROBLEM — K25.9 GASTRIC ULCER, UNSPECIFIED AS ACUTE OR CHRONIC, WITHOUT HEMORRHAGE OR PERFORATION: Chronic | Status: ACTIVE | Noted: 2024-01-23

## 2024-01-26 PROBLEM — M19.90 UNSPECIFIED OSTEOARTHRITIS, UNSPECIFIED SITE: Chronic | Status: ACTIVE | Noted: 2024-01-23

## 2024-01-26 PROBLEM — G47.30 SLEEP APNEA, UNSPECIFIED: Chronic | Status: ACTIVE | Noted: 2024-01-23

## 2024-01-26 PROBLEM — H40.9 UNSPECIFIED GLAUCOMA: Chronic | Status: ACTIVE | Noted: 2024-01-23

## 2024-01-26 PROBLEM — J45.998 OTHER ASTHMA: Chronic | Status: ACTIVE | Noted: 2024-01-23

## 2024-01-26 LAB
ESTIMATED AVERAGE GLUCOSE: 117 MG/DL
HBA1C MFR BLD HPLC: 5.7 %

## 2024-01-26 PROCEDURE — 99214 OFFICE O/P EST MOD 30 MIN: CPT | Mod: 25,95

## 2024-01-26 PROCEDURE — 93000 ELECTROCARDIOGRAM COMPLETE: CPT | Mod: NC

## 2024-01-26 PROCEDURE — 99214 OFFICE O/P EST MOD 30 MIN: CPT | Mod: 25

## 2024-01-26 RX ORDER — FORMOTEROL FUMARATE 100 %
POWDER (GRAM) MISCELLANEOUS
Refills: 0 | Status: ACTIVE | COMMUNITY

## 2024-01-26 NOTE — REASON FOR VISIT
[Home] : at home, [unfilled] , at the time of the visit. [Medical Office: ([facility])___] : at the medical office located in  [Patient] : the patient [Follow - Up] : a follow-up visit

## 2024-01-26 NOTE — ASSESSMENT
[FreeTextEntry1] : 67 year old female with PMH of Type 2 Diabetes since ~age 62, Hypothyroidism (Was on levothyroxine 50mcg for 3 years, stopped 2 months ago), GERD, BMI 32. Planned right TKR tentatively for Jan 2024 is presenting for Type 2 Diabetes and Hypothyroidism.  1. Type 2 diabetes mellitus. HbA1c 6.0% May 2023 --> 5.7% Jan 2024  -We discussed the cardiovascular and microvascular complications of uncontrolled diabetes. We discussed the importance of diet and exercise and lifestyle modification for glycemic control and weight loss. We discussed pharmacologic options for glycemic control and weight loss. -Jardiance 10mg QD and metformin 500mg BID.  -Discussed previously briefly GLP-1 use. Pt may consider it in future.  -LDL a goal <70, continue statin  -Urine ACR Nov 2023   -Opthal UTD  -Foot care discussed   2. Hypothyroidism  -Nov 2023 TSH wnl  -C/w levothyroxine 50mcg QD   3. Screening for Osteoporosis  -Send for DEXA scan, pending.   Patient verbalized understanding of the above. All questions were answered to patient's satisfaction. Dispo: Patient will follow up in 6 months.    ***Patient is cleared from Endocrine stand point for planned right total knee replacement on Jan 31st, 2024***

## 2024-01-26 NOTE — HISTORY OF PRESENT ILLNESS
[FreeTextEntry1] : Ms. Loyd is presenting for follow up of Type 2 Diabetes and Hypothyroidism.  Daughter present, refuses . Speaks Portuguese.   68 year old female with PMH of Type 2 Diabetes since ~age 62, Hypothyroidism (Was on levothyroxine 50mcg for 3 years, stopped 2 months ago), GERD, BMI 32. Planned right TKR tentatively for Jan 2024.   Reports no microvascular complications, no history of retinopathy, nephropathy or neuropathy.  Reports no history of cardiovascular risk factors, no history of CAD, CHF or CVA in the past. Possible CAD per cardio note.   Current DM meds: Synjardy XR 12.5/1000mg QD (No h/o UTI) - notes it is very expensive.  Prior DM meds:  Other pertinent meds:   POCT A1c%: 6.0% May 2023 --> 5.7% Jan 2024  POCT BG:   FSG: Checks 2x daily  Pre-Breakfast: 140s to 160s  Hypoglycemic episodes: No   Diet: No appetite. Daughter notes she loves carbs.  Breakfast: toast with cheese or lettuce with coffee with splenda  Lunch: skip or any protein with rice or potatoes.  Dinner: Fruit or yogurt Snacks: Fruit: Apple, tangerine, grapes, banana. Has 1-2 servings per day. Juice only when dizzy.   Exercise: None   Urine micro: needs ACE: C-peptide:  Cr: 0.83 GFR:  Lipid profile: LDL 64, TGs 73 June 2023 Statin: Lipitor 20mg  HbA1c%:  Ophthalmology: June 2023 Neuropathy: None  Podiatry/Diabetic foot exam:   #Hypothyroidism  -Patient diagnosed a few years ago Was on LT4 for 3 years anf then stopped a few months ago.

## 2024-01-29 RX ORDER — SODIUM CHLORIDE 9 MG/ML
1000 INJECTION, SOLUTION INTRAVENOUS
Refills: 0 | Status: DISCONTINUED | OUTPATIENT
Start: 2024-01-31 | End: 2024-02-01

## 2024-01-29 RX ORDER — RIVAROXABAN 15 MG-20MG
10 KIT ORAL
Refills: 0 | Status: DISCONTINUED | OUTPATIENT
Start: 2024-02-01 | End: 2024-02-01

## 2024-01-29 RX ORDER — DEXTROSE 50 % IN WATER 50 %
12.5 SYRINGE (ML) INTRAVENOUS ONCE
Refills: 0 | Status: DISCONTINUED | OUTPATIENT
Start: 2024-01-31 | End: 2024-02-01

## 2024-01-29 RX ORDER — TRANEXAMIC ACID 100 MG/ML
1 INJECTION, SOLUTION INTRAVENOUS ONCE
Refills: 0 | Status: DISCONTINUED | OUTPATIENT
Start: 2024-01-31 | End: 2024-02-01

## 2024-01-29 RX ORDER — INSULIN LISPRO 100/ML
VIAL (ML) SUBCUTANEOUS AT BEDTIME
Refills: 0 | Status: DISCONTINUED | OUTPATIENT
Start: 2024-01-31 | End: 2024-02-01

## 2024-01-29 RX ORDER — DEXTROSE 50 % IN WATER 50 %
15 SYRINGE (ML) INTRAVENOUS ONCE
Refills: 0 | Status: DISCONTINUED | OUTPATIENT
Start: 2024-01-31 | End: 2024-02-01

## 2024-01-29 RX ORDER — DEXTROSE 50 % IN WATER 50 %
25 SYRINGE (ML) INTRAVENOUS ONCE
Refills: 0 | Status: DISCONTINUED | OUTPATIENT
Start: 2024-01-31 | End: 2024-02-01

## 2024-01-29 RX ORDER — GLUCAGON INJECTION, SOLUTION 0.5 MG/.1ML
1 INJECTION, SOLUTION SUBCUTANEOUS ONCE
Refills: 0 | Status: DISCONTINUED | OUTPATIENT
Start: 2024-01-31 | End: 2024-02-01

## 2024-01-29 RX ORDER — INSULIN LISPRO 100/ML
VIAL (ML) SUBCUTANEOUS
Refills: 0 | Status: DISCONTINUED | OUTPATIENT
Start: 2024-01-31 | End: 2024-02-01

## 2024-01-29 NOTE — ADDENDUM
[FreeTextEntry1] : Presurgical labs are within acceptable limits. There are no medical contraindications to proceeding with the planned surgery. Routine perioperative hemodynamic monitoring is recommended.

## 2024-01-29 NOTE — HISTORY OF PRESENT ILLNESS
[Coronary Artery Disease] : coronary artery disease [Asthma] : asthma [No Adverse Anesthesia Reaction] : no adverse anesthesia reaction in self or family member [Diabetes] : diabetes [(Patient denies any chest pain, claudication, dyspnea on exertion, orthopnea, palpitations or syncope)] : Patient denies any chest pain, claudication, dyspnea on exertion, orthopnea, palpitations or syncope [Aortic Stenosis] : no aortic stenosis [Atrial Fibrillation] : no atrial fibrillation [Recent Myocardial Infarction] : no recent myocardial infarction [Implantable Device/Pacemaker] : no implantable device/pacemaker [COPD] : no COPD [Sleep Apnea] : no sleep apnea [Smoker] : not a smoker [Chronic Anticoagulation] : no chronic anticoagulation [Chronic Kidney Disease] : no chronic kidney disease [FreeTextEntry1] : Right Knee Replacement [FreeTextEntry2] : 1/31/24 [FreeTextEntry3] : Dr. Singh [FreeTextEntry4] : DEVEN BEE,  59, is here for presurgical evaluation. Pt is accompanied by her daughter. Patient is overall feeling well. Pt denies chest pain, dyspnea, palpitations, lightheadedness, fever, chills, or sweats Clearance wiil be faxed to [phone]

## 2024-01-30 LAB — ABO RH CONFIRMATION: SIGNIFICANT CHANGE UP

## 2024-01-30 NOTE — ASSESSMENT
[FreeTextEntry1] :     HDL 63 LDL 64 TG 73 (5/2023).  A1C 5.7 (1/2024)   PHARM NUCLEAR STRESS 1/2024: 1. Negative for ischemia/infarct, EF 70% 2. Normal HR/BP response, resting 114/60, peak 132/64

## 2024-01-30 NOTE — HISTORY OF PRESENT ILLNESS
[FreeTextEntry1] : Patient is a 68yo F with obesity, DM, HLD, CAD here for cardiac follow up and pre-op evaluation.  Was having CP/SOB after COVID vaccine.  Dong better now. Had recent nuclear stress testing. Patient denies PND/orthopnea/edema/palpitations/syncope/claudication . Stil with dyspnea unchanged, no CP.    Here with family, originally from  [facility]. Lives with daughter.  passed few months ago and now moved to [place].

## 2024-01-30 NOTE — DISCUSSION/SUMMARY
[EKG obtained to assist in diagnosis and management of assessed problem(s)] : EKG obtained to assist in diagnosis and management of assessed problem(s) [FreeTextEntry1] : Patient is a 67yo F with obesity, DM, HLD, CAD, PE after AstraZeneza vaccine in 2020 here for cardiac follow up and pre-op evaluation -Recent nuclear stress 1/2024 negative, normal function as well and BP -History somewhat unclear regarding if truly has CAD diagnosed at home in [place], on antianginal medication not available in [place]. Daughter states history that sounds more c/w VTE however.  .Prior PE and no longer on A/C, remains on ASA -Follows with pulmonary as well , has asthma and being evaluated for LEMUEL  -Planned for knee surgery next week   1. CV stable at low CV risk for knee surgery given normal nuclear stress and EF (may proceed without further cardiac testing). May hold ASA 5-7 days prior and restart post-op. ? benefit long term but tolerating and will continue for now. ALso hold jardiance 3 days prior to surgery  2. Still recommend echo, can be done post op and will follow up then 3. Follow up 2-3 months 4. Recommend aggressive diet and lifestyle modifications  5. Diabetes management per endocrine.  6. Continue statin

## 2024-01-31 ENCOUNTER — TRANSCRIPTION ENCOUNTER (OUTPATIENT)
Age: 69
End: 2024-01-31

## 2024-01-31 ENCOUNTER — INPATIENT (INPATIENT)
Facility: HOSPITAL | Age: 69
LOS: 0 days | Discharge: HOME HEALTH | DRG: 302 | End: 2024-02-01
Attending: STUDENT IN AN ORGANIZED HEALTH CARE EDUCATION/TRAINING PROGRAM | Admitting: STUDENT IN AN ORGANIZED HEALTH CARE EDUCATION/TRAINING PROGRAM
Payer: MEDICAID

## 2024-01-31 ENCOUNTER — RESULT REVIEW (OUTPATIENT)
Age: 69
End: 2024-01-31

## 2024-01-31 ENCOUNTER — APPOINTMENT (OUTPATIENT)
Dept: ORTHOPEDIC SURGERY | Facility: HOSPITAL | Age: 69
End: 2024-01-31

## 2024-01-31 VITALS
RESPIRATION RATE: 16 BRPM | OXYGEN SATURATION: 100 % | HEART RATE: 63 BPM | WEIGHT: 187.39 LBS | TEMPERATURE: 98 F | HEIGHT: 58 IN | DIASTOLIC BLOOD PRESSURE: 62 MMHG | SYSTOLIC BLOOD PRESSURE: 107 MMHG

## 2024-01-31 DIAGNOSIS — Z90.721 ACQUIRED ABSENCE OF OVARIES, UNILATERAL: Chronic | ICD-10-CM

## 2024-01-31 DIAGNOSIS — M17.0 BILATERAL PRIMARY OSTEOARTHRITIS OF KNEE: ICD-10-CM

## 2024-01-31 LAB
ANION GAP SERPL CALC-SCNC: 2 MMOL/L — LOW (ref 5–17)
BUN SERPL-MCNC: 15 MG/DL — SIGNIFICANT CHANGE UP (ref 7–23)
CALCIUM SERPL-MCNC: 8.9 MG/DL — SIGNIFICANT CHANGE UP (ref 8.5–10.1)
CHLORIDE SERPL-SCNC: 109 MMOL/L — HIGH (ref 96–108)
CO2 SERPL-SCNC: 26 MMOL/L — SIGNIFICANT CHANGE UP (ref 22–31)
CREAT SERPL-MCNC: 0.82 MG/DL — SIGNIFICANT CHANGE UP (ref 0.5–1.3)
EGFR: 79 ML/MIN/1.73M2 — SIGNIFICANT CHANGE UP
GLUCOSE BLDC GLUCOMTR-MCNC: 109 MG/DL — HIGH (ref 70–99)
GLUCOSE BLDC GLUCOMTR-MCNC: 126 MG/DL — HIGH (ref 70–99)
GLUCOSE BLDC GLUCOMTR-MCNC: 133 MG/DL — HIGH (ref 70–99)
GLUCOSE BLDC GLUCOMTR-MCNC: 159 MG/DL — HIGH (ref 70–99)
GLUCOSE BLDC GLUCOMTR-MCNC: 220 MG/DL — HIGH (ref 70–99)
GLUCOSE SERPL-MCNC: 137 MG/DL — HIGH (ref 70–99)
HCT VFR BLD CALC: 32.7 % — LOW (ref 34.5–45)
HGB BLD-MCNC: 10.5 G/DL — LOW (ref 11.5–15.5)
MCHC RBC-ENTMCNC: 21 PG — LOW (ref 27–34)
MCHC RBC-ENTMCNC: 32.1 GM/DL — SIGNIFICANT CHANGE UP (ref 32–36)
MCV RBC AUTO: 65.3 FL — LOW (ref 80–100)
PLATELET # BLD AUTO: 279 K/UL — SIGNIFICANT CHANGE UP (ref 150–400)
POTASSIUM SERPL-MCNC: 4.2 MMOL/L — SIGNIFICANT CHANGE UP (ref 3.5–5.3)
POTASSIUM SERPL-SCNC: 4.2 MMOL/L — SIGNIFICANT CHANGE UP (ref 3.5–5.3)
RBC # BLD: 5.01 M/UL — SIGNIFICANT CHANGE UP (ref 3.8–5.2)
RBC # FLD: 21.2 % — HIGH (ref 10.3–14.5)
SODIUM SERPL-SCNC: 137 MMOL/L — SIGNIFICANT CHANGE UP (ref 135–145)
WBC # BLD: 6.89 K/UL — SIGNIFICANT CHANGE UP (ref 3.8–10.5)
WBC # FLD AUTO: 6.89 K/UL — SIGNIFICANT CHANGE UP (ref 3.8–10.5)

## 2024-01-31 PROCEDURE — 88300 SURGICAL PATH GROSS: CPT

## 2024-01-31 PROCEDURE — 85027 COMPLETE CBC AUTOMATED: CPT

## 2024-01-31 PROCEDURE — 99221 1ST HOSP IP/OBS SF/LOW 40: CPT

## 2024-01-31 PROCEDURE — 73560 X-RAY EXAM OF KNEE 1 OR 2: CPT | Mod: RT

## 2024-01-31 PROCEDURE — C1713: CPT

## 2024-01-31 PROCEDURE — 88300 SURGICAL PATH GROSS: CPT | Mod: 26

## 2024-01-31 PROCEDURE — 36415 COLL VENOUS BLD VENIPUNCTURE: CPT

## 2024-01-31 PROCEDURE — C1776: CPT

## 2024-01-31 PROCEDURE — 20985 CPTR-ASST DIR MS PX: CPT

## 2024-01-31 PROCEDURE — 80048 BASIC METABOLIC PNL TOTAL CA: CPT

## 2024-01-31 PROCEDURE — 82962 GLUCOSE BLOOD TEST: CPT

## 2024-01-31 PROCEDURE — 27447 TOTAL KNEE ARTHROPLASTY: CPT | Mod: RT

## 2024-01-31 PROCEDURE — 97607 NEG PRS WND THR NDME<=50SQCM: CPT

## 2024-01-31 PROCEDURE — 73560 X-RAY EXAM OF KNEE 1 OR 2: CPT | Mod: 26,RT

## 2024-01-31 RX ORDER — SENNA PLUS 8.6 MG/1
2 TABLET ORAL
Qty: 28 | Refills: 0
Start: 2024-01-31 | End: 2024-02-13

## 2024-01-31 RX ORDER — ATORVASTATIN CALCIUM 80 MG/1
20 TABLET, FILM COATED ORAL AT BEDTIME
Refills: 0 | Status: DISCONTINUED | OUTPATIENT
Start: 2024-01-31 | End: 2024-02-01

## 2024-01-31 RX ORDER — CEFAZOLIN SODIUM 1 G
2000 VIAL (EA) INJECTION EVERY 8 HOURS
Refills: 0 | Status: DISCONTINUED | OUTPATIENT
Start: 2024-01-31 | End: 2024-01-31

## 2024-01-31 RX ORDER — POLYETHYLENE GLYCOL 3350 17 G/17G
17 POWDER, FOR SOLUTION ORAL AT BEDTIME
Refills: 0 | Status: DISCONTINUED | OUTPATIENT
Start: 2024-01-31 | End: 2024-02-01

## 2024-01-31 RX ORDER — ONDANSETRON 8 MG/1
8 TABLET, FILM COATED ORAL EVERY 8 HOURS
Refills: 0 | Status: DISCONTINUED | OUTPATIENT
Start: 2024-01-31 | End: 2024-02-01

## 2024-01-31 RX ORDER — ASPIRIN/CALCIUM CARB/MAGNESIUM 324 MG
1 TABLET ORAL
Qty: 28 | Refills: 0
Start: 2024-01-31 | End: 2024-02-13

## 2024-01-31 RX ORDER — DORZOLAMIDE HYDROCHLORIDE TIMOLOL MALEATE 20; 5 MG/ML; MG/ML
1 SOLUTION/ DROPS OPHTHALMIC
Refills: 0 | Status: DISCONTINUED | OUTPATIENT
Start: 2024-01-31 | End: 2024-02-01

## 2024-01-31 RX ORDER — POLYETHYLENE GLYCOL 3350 17 G/17G
17 POWDER, FOR SOLUTION ORAL
Qty: 1 | Refills: 0
Start: 2024-01-31 | End: 2024-02-13

## 2024-01-31 RX ORDER — PROCHLORPERAZINE MALEATE 5 MG
10 TABLET ORAL EVERY 8 HOURS
Refills: 0 | Status: DISCONTINUED | OUTPATIENT
Start: 2024-01-31 | End: 2024-02-01

## 2024-01-31 RX ORDER — FOLIC ACID 0.8 MG
1 TABLET ORAL DAILY
Refills: 0 | Status: DISCONTINUED | OUTPATIENT
Start: 2024-01-31 | End: 2024-02-01

## 2024-01-31 RX ORDER — SODIUM CHLORIDE 9 MG/ML
1000 INJECTION INTRAMUSCULAR; INTRAVENOUS; SUBCUTANEOUS
Refills: 0 | Status: DISCONTINUED | OUTPATIENT
Start: 2024-01-31 | End: 2024-02-01

## 2024-01-31 RX ORDER — METFORMIN HYDROCHLORIDE 850 MG/1
1 TABLET ORAL
Refills: 0 | DISCHARGE

## 2024-01-31 RX ORDER — LIFITEGRAST 50 MG/ML
1 SOLUTION/ DROPS OPHTHALMIC
Refills: 0 | DISCHARGE

## 2024-01-31 RX ORDER — BRIMONIDINE TARTRATE 2 MG/MG
1 SOLUTION/ DROPS OPHTHALMIC
Refills: 0 | Status: DISCONTINUED | OUTPATIENT
Start: 2024-01-31 | End: 2024-02-01

## 2024-01-31 RX ORDER — PANTOPRAZOLE SODIUM 20 MG/1
40 TABLET, DELAYED RELEASE ORAL
Refills: 0 | Status: DISCONTINUED | OUTPATIENT
Start: 2024-01-31 | End: 2024-02-01

## 2024-01-31 RX ORDER — LEVOTHYROXINE SODIUM 125 MCG
50 TABLET ORAL DAILY
Refills: 0 | Status: DISCONTINUED | OUTPATIENT
Start: 2024-01-31 | End: 2024-02-01

## 2024-01-31 RX ORDER — OXYCODONE HYDROCHLORIDE 5 MG/1
1 TABLET ORAL
Qty: 30 | Refills: 0
Start: 2024-01-31 | End: 2024-02-04

## 2024-01-31 RX ORDER — PANTOPRAZOLE SODIUM 20 MG/1
40 TABLET, DELAYED RELEASE ORAL ONCE
Refills: 0 | Status: COMPLETED | OUTPATIENT
Start: 2024-01-31 | End: 2024-01-31

## 2024-01-31 RX ORDER — BRIMONIDINE TARTRATE 2 MG/MG
1 SOLUTION/ DROPS OPHTHALMIC
Refills: 0 | DISCHARGE

## 2024-01-31 RX ORDER — LEVOTHYROXINE SODIUM 125 MCG
1 TABLET ORAL
Refills: 0 | DISCHARGE

## 2024-01-31 RX ORDER — PANTOPRAZOLE SODIUM 20 MG/1
1 TABLET, DELAYED RELEASE ORAL
Refills: 0 | DISCHARGE

## 2024-01-31 RX ORDER — ATORVASTATIN CALCIUM 80 MG/1
1 TABLET, FILM COATED ORAL
Refills: 0 | DISCHARGE

## 2024-01-31 RX ORDER — OXYCODONE HYDROCHLORIDE 5 MG/1
5 TABLET ORAL EVERY 4 HOURS
Refills: 0 | Status: DISCONTINUED | OUTPATIENT
Start: 2024-01-31 | End: 2024-02-01

## 2024-01-31 RX ORDER — DORZOLAMIDE HYDROCHLORIDE TIMOLOL MALEATE 20; 5 MG/ML; MG/ML
1 SOLUTION/ DROPS OPHTHALMIC
Refills: 0 | DISCHARGE

## 2024-01-31 RX ORDER — ONDANSETRON 8 MG/1
4 TABLET, FILM COATED ORAL ONCE
Refills: 0 | Status: DISCONTINUED | OUTPATIENT
Start: 2024-01-31 | End: 2024-01-31

## 2024-01-31 RX ORDER — CELECOXIB 200 MG/1
1 CAPSULE ORAL
Refills: 0 | DISCHARGE

## 2024-01-31 RX ORDER — CEFAZOLIN SODIUM 1 G
2000 VIAL (EA) INJECTION EVERY 8 HOURS
Refills: 0 | Status: COMPLETED | OUTPATIENT
Start: 2024-01-31 | End: 2024-02-01

## 2024-01-31 RX ORDER — FENTANYL CITRATE 50 UG/ML
50 INJECTION INTRAVENOUS
Refills: 0 | Status: DISCONTINUED | OUTPATIENT
Start: 2024-01-31 | End: 2024-01-31

## 2024-01-31 RX ORDER — HYDROMORPHONE HYDROCHLORIDE 2 MG/ML
0.5 INJECTION INTRAMUSCULAR; INTRAVENOUS; SUBCUTANEOUS EVERY 4 HOURS
Refills: 0 | Status: DISCONTINUED | OUTPATIENT
Start: 2024-01-31 | End: 2024-02-01

## 2024-01-31 RX ORDER — CELECOXIB 200 MG/1
200 CAPSULE ORAL EVERY 12 HOURS
Refills: 0 | Status: DISCONTINUED | OUTPATIENT
Start: 2024-01-31 | End: 2024-02-01

## 2024-01-31 RX ORDER — OXYCODONE HYDROCHLORIDE 5 MG/1
5 TABLET ORAL ONCE
Refills: 0 | Status: DISCONTINUED | OUTPATIENT
Start: 2024-01-31 | End: 2024-01-31

## 2024-01-31 RX ORDER — OXYCODONE HYDROCHLORIDE 5 MG/1
10 TABLET ORAL EVERY 4 HOURS
Refills: 0 | Status: DISCONTINUED | OUTPATIENT
Start: 2024-01-31 | End: 2024-02-01

## 2024-01-31 RX ORDER — ACETAMINOPHEN 500 MG
1000 TABLET ORAL EVERY 8 HOURS
Refills: 0 | Status: DISCONTINUED | OUTPATIENT
Start: 2024-01-31 | End: 2024-02-01

## 2024-01-31 RX ORDER — RIVAROXABAN 15 MG-20MG
10 KIT ORAL ONCE
Refills: 0 | Status: COMPLETED | OUTPATIENT
Start: 2024-01-31 | End: 2024-01-31

## 2024-01-31 RX ORDER — ONDANSETRON 8 MG/1
1 TABLET, FILM COATED ORAL
Qty: 15 | Refills: 0
Start: 2024-01-31 | End: 2024-02-04

## 2024-01-31 RX ORDER — SENNA PLUS 8.6 MG/1
2 TABLET ORAL AT BEDTIME
Refills: 0 | Status: DISCONTINUED | OUTPATIENT
Start: 2024-01-31 | End: 2024-02-01

## 2024-01-31 RX ORDER — EMPAGLIFLOZIN 10 MG/1
1 TABLET, FILM COATED ORAL
Refills: 0 | DISCHARGE

## 2024-01-31 RX ORDER — FERROUS SULFATE 325(65) MG
325 TABLET ORAL DAILY
Refills: 0 | Status: DISCONTINUED | OUTPATIENT
Start: 2024-01-31 | End: 2024-02-01

## 2024-01-31 RX ORDER — CEPHALEXIN 500 MG
250 CAPSULE ORAL EVERY 6 HOURS
Refills: 0 | Status: DISCONTINUED | OUTPATIENT
Start: 2024-02-01 | End: 2024-02-01

## 2024-01-31 RX ORDER — SODIUM CHLORIDE 9 MG/ML
1000 INJECTION, SOLUTION INTRAVENOUS
Refills: 0 | Status: DISCONTINUED | OUTPATIENT
Start: 2024-01-31 | End: 2024-01-31

## 2024-01-31 RX ORDER — RIVAROXABAN 15 MG-20MG
1 KIT ORAL
Qty: 13 | Refills: 0
Start: 2024-01-31 | End: 2024-02-12

## 2024-01-31 RX ORDER — ASCORBIC ACID 60 MG
500 TABLET,CHEWABLE ORAL
Refills: 0 | Status: DISCONTINUED | OUTPATIENT
Start: 2024-01-31 | End: 2024-02-01

## 2024-01-31 RX ADMIN — Medication 2000 MILLIGRAM(S): at 21:38

## 2024-01-31 RX ADMIN — RIVAROXABAN 10 MILLIGRAM(S): KIT at 21:37

## 2024-01-31 RX ADMIN — BRIMONIDINE TARTRATE 1 DROP(S): 2 SOLUTION/ DROPS OPHTHALMIC at 19:31

## 2024-01-31 RX ADMIN — Medication 500 MILLIGRAM(S): at 17:46

## 2024-01-31 RX ADMIN — OXYCODONE HYDROCHLORIDE 10 MILLIGRAM(S): 5 TABLET ORAL at 22:06

## 2024-01-31 RX ADMIN — CELECOXIB 200 MILLIGRAM(S): 200 CAPSULE ORAL at 17:47

## 2024-01-31 RX ADMIN — OXYCODONE HYDROCHLORIDE 10 MILLIGRAM(S): 5 TABLET ORAL at 21:36

## 2024-01-31 RX ADMIN — SENNA PLUS 2 TABLET(S): 8.6 TABLET ORAL at 21:38

## 2024-01-31 RX ADMIN — HYDROMORPHONE HYDROCHLORIDE 0.5 MILLIGRAM(S): 2 INJECTION INTRAMUSCULAR; INTRAVENOUS; SUBCUTANEOUS at 17:47

## 2024-01-31 RX ADMIN — DORZOLAMIDE HYDROCHLORIDE TIMOLOL MALEATE 1 DROP(S): 20; 5 SOLUTION/ DROPS OPHTHALMIC at 19:32

## 2024-01-31 RX ADMIN — Medication 1000 MILLIGRAM(S): at 21:35

## 2024-01-31 RX ADMIN — CELECOXIB 200 MILLIGRAM(S): 200 CAPSULE ORAL at 17:50

## 2024-01-31 RX ADMIN — ATORVASTATIN CALCIUM 20 MILLIGRAM(S): 80 TABLET, FILM COATED ORAL at 21:37

## 2024-01-31 RX ADMIN — POLYETHYLENE GLYCOL 3350 17 GRAM(S): 17 POWDER, FOR SOLUTION ORAL at 21:38

## 2024-01-31 RX ADMIN — HYDROMORPHONE HYDROCHLORIDE 0.5 MILLIGRAM(S): 2 INJECTION INTRAMUSCULAR; INTRAVENOUS; SUBCUTANEOUS at 18:12

## 2024-01-31 NOTE — PHYSICAL THERAPY INITIAL EVALUATION ADULT - GAIT TRAINING, PT EVAL
BY DC: Amb with RW > 200ft with SBG/sup FWBAT RLE with steady gait and without R knee giving way; able to negotiate 3 steps with HR/cane step-to technique

## 2024-01-31 NOTE — DISCHARGE NOTE PROVIDER - NSDCFUSCHEDAPPT_GEN_ALL_CORE_FT
[facility]  ORTHOSURG [address]  Scheduled Appointment: 02/22/2024    Noah Wilde  [facility]  OPHTHALM [address]  Scheduled Appointment: 03/29/2024    [facility]  CARDIOLOGY [address]  Scheduled Appointment: 04/02/2024    Joey Huston  [facility]  CARDIOLOGY [address]  Scheduled Appointment: 04/29/2024

## 2024-01-31 NOTE — PHYSICAL THERAPY INITIAL EVALUATION ADULT - ACTIVE RANGE OF MOTION EXAMINATION, REHAB EVAL
note OA L knee as well/denise. upper extremity Active ROM was WNL (within normal limits)/bilateral  lower extremity Active ROM was WFL (within functional limits)

## 2024-01-31 NOTE — CONSULT NOTE ADULT - SUBJECTIVE AND OBJECTIVE BOX
PCP: Dr. Britton Ceron    CHIEF COMPLAINT: right knee OA    HISTORY OF THE PRESENT ILLNESS: this is a  69 yo female who speaks Tajik only, with PMH of  glaucoma, HLD, hypothyroidism, B/L LE edema, h/o PE, unclear when but no longer on AC, only ASA, LEMUEL, gastric ulcer, DM type 2 and  OA  of her  B/L knees with right knee pain > left, tx conservatively , however the pain has become worse over the last few months and she has now opted for orthopedic surgery.   Pt is seen in PACU, still groggy from anesthesia, VSS in NAD, denies any CP or SOB.  We are consulted for medical management    PAST MEDICAL HISTORY: as above    PAST SURGICAL HISTORY: right oopherectomy    FAMILY HISTORY:   heart disease    SOCIAL HISTORY:  no smoking, no alcohol, no drugs    ALLERGIES: NKDA    HOME MEDS: see med rec      REVIEW OF SYSTEMS:   All 10 systems reviewed in detailed and found to be negative with the exception of what has already been described above      Vital Signs Last 24 Hrs  T(C): 36.8 (31 Jan 2024 09:11), Max: 36.8 (31 Jan 2024 09:11)  T(F): 98.2 (31 Jan 2024 09:11), Max: 98.2 (31 Jan 2024 09:11)  HR: 63 (31 Jan 2024 09:11) (63 - 63)  BP: 133/65 (31 Jan 2024 15:00) (107/62 - 135/72)  BP(mean): --  RR: 16 (31 Jan 2024 09:11) (16 - 16)  SpO2: 100% (31 Jan 2024 09:11) (100% - 100%)      MEDICATIONS  (STANDING):  lactated ringers. 1000 milliLiter(s) (125 mL/Hr) IV Continuous <Continuous>  tranexamic acid 2% Topical Irrigation 1 Application(s) IntraArticular once    MEDICATIONS  (PRN):  fentaNYL    Injectable 50 MICROGram(s) IV Push every 10 minutes PRN Severe Pain (7 - 10)  ondansetron Injectable 4 milliGRAM(s) IV Push once PRN Nausea and/or Vomiting  oxyCODONE    IR 5 milliGRAM(s) Oral once PRN Moderate Pain (4 - 6)    PHYSICAL EXAM:    GENERAL: Comfortable, no acute distress   HEAD:  Normocephalic, atraumatic  EYES: EOMI, PERRLA  HEENT: Moist mucous membranes  NECK: Supple, No JVD  NERVOUS SYSTEM:  Alert & Oriented X3, Motor Strength 5/5 B/L upper and lower extremities  CHEST/LUNG: Clear to auscultation bilaterally  HEART: Regular rate and rhythm  ABDOMEN: Soft, non tender, Nondistended, Bowel sounds present  GENITOURINARY: Voiding, no palpable bladder  EXTREMITIES:   No clubbing, cyanosis, or edema  MUSCULOSKELETAL- right knee ace dsg c/d/i  SKIN-no rash      LABS: pending        IMPRESSION: 69 yo female with above pmh a/w:  # OA right knee  # S/P right TKR    POD#0  pain control  PT eval  Bowel regimen  IVF's  Finish ABXs  regular diet  PPI  VTE prophylaxis  monitor CBC/BMP    #DM, type 2  A1c 5.7  hold OHAS  bgm ss    #Glaucoma  cont eye gtts    # HLD  cont statin    # hypothryoidism  cont levothyroxine    #BLE edema  'hold torsemide for now    # h/o gastric ulcer  cont PPI    # Vte prophylaxis/ h/o PE  Cap score #11  Xarelto 10 mg x 12 days then transisiton back to ASA  Venodynes  INC mobility      Thank you for the consult, will follow

## 2024-01-31 NOTE — PHYSICAL THERAPY INITIAL EVALUATION ADULT - LEVEL OF INDEPENDENCE: SIT/SUPINE, REHAB EVAL
due to short stature/feet not touching ground when seated at edge of stretcher ,afraid of slipping off edege of stretcher/moderate assist (50% patients effort)

## 2024-01-31 NOTE — PHYSICAL THERAPY INITIAL EVALUATION ADULT - GENERAL OBSERVATIONS, REHAB EVAL
supine recumbent on PACU stretcher with GEORGI hugger hose under blankets , B Flowtrons , IVF infusing LUE, R knee dressing C/D/I with LONNIE device in place ,pt of short stature < 5ft tall

## 2024-01-31 NOTE — DISCHARGE NOTE PROVIDER - CARE PROVIDER_API CALL
Enmanuel Singh  Orthopaedic Surgery  [address] [place] [id number]  Phone: [phone]  Fax: [phone]  Follow Up Time:

## 2024-01-31 NOTE — PHYSICAL THERAPY INITIAL EVALUATION ADULT - GAIT DISTANCE, PT EVAL
8 steps fwd,2 steps bkwd to stretcher =10 steps however R operated knee buckling partially so did not go further

## 2024-01-31 NOTE — DISCHARGE NOTE PROVIDER - HOSPITAL COURSE
H&P:  Pt is a 68y Female   PAST MEDICAL & SURGICAL HISTORY:  Arthritis      Glaucoma      Hyperlipidemia      Sleep apnea      Type II diabetes mellitus      Lower extremity edema      Stomach ulcer      Seasonal asthma      Pulmonary embolism      Hypothyroid      History of right oophorectomy           Now s/p Right Total Knee Arthroplasty. Pt is afebrile with stable vital signs. Pain is controlled. Alert and Oriented. Exam reveals intact EHL FHL TA GS, +DP. Dressing is clean and dry.    Hospital Course:  Patient presented to [facility] medically cleared for elective Right Total Knee Replacement Surgery, having failed outpatient conservative management. Prophylactic IV antibiotics were started before the procedure and continued for 24 hours post-op, then transitioned to PO antibiotics for remainder of hospital stay and continued for 7 days after discharge. They were admitted after surgery to the orthopedic floor. There were no complications during the hospital stay. All home medications were continued.     **Pt received **U PRBC post op for Acute Blood Loss Anemia.    Routine consults were obtained from Physical Therapy for twice daily PT and from the Hospitalist for Medical Co-management. Patient was placed on anticoagulation. Pertinent home medications were continued. Daily labs were followed.      On POD 0 pt was stable overnight. No major events post-op. Pt received twice daily PT. The plan is for DC to home with home PT** or to Rehab for ongoing PT**. The orthopedic Attending is aware and agrees.      **POD1 DC DOCUMENTATION** (Keep or Delete depending on scenario)  The patient had no post-operative complications and clinically progressed faster than anticipated. The following condition(s) were actively treated during the hospital stay:  Diabetes  HLD  Asthma  Obstructive Sleep Apnea  Hx of Pulmonary Embolism  Hypothyroidism  The patient met criteria for discharge before the 2nd night of the stay. The patient was appropriately and safely discharged home with home PT.    ******INCOMPLETE NOTE IN PREPARATION FOR DISPO PLANNING*******  ******INCOMPLETE NOTE IN PREPARATION FOR DISPO PLANNING*******  ******INCOMPLETE NOTE IN PREPARATION FOR DISPO PLANNING*******   H&P:  Pt is a 68y Female   PAST MEDICAL & SURGICAL HISTORY:  Arthritis      Glaucoma      Hyperlipidemia      Sleep apnea      Type II diabetes mellitus      Lower extremity edema      Stomach ulcer      Seasonal asthma      Pulmonary embolism      Hypothyroid      History of right oophorectomy           Now s/p Right Total Knee Arthroplasty. Pt is afebrile with stable vital signs. Pain is controlled. Alert and Oriented. Exam reveals intact EHL FHL TA GS, +DP. Dressing is clean and dry.    Hospital Course:  Patient presented to [facility] medically cleared for elective Right Total Knee Replacement Surgery, having failed outpatient conservative management. Prophylactic IV antibiotics were started before the procedure and continued for 24 hours post-op, then transitioned to PO antibiotics for remainder of hospital stay and continued for 7 days after discharge. They were admitted after surgery to the orthopedic floor. There were no complications during the hospital stay. All home medications were continued.     Routine consults were obtained from Physical Therapy for twice daily PT and from the Hospitalist for Medical Co-management. Patient was placed on anticoagulation. Pertinent home medications were continued. Daily labs were followed.      On POD 0 pt was stable overnight. No major events post-op. Pt received twice daily PT. The plan is for DC to home with home PT. The orthopedic Attending is aware and agrees.    The patient had no post-operative complications and clinically progressed faster than anticipated. The following condition(s) were actively treated during the hospital stay:  Diabetes  HLD  Asthma  Obstructive Sleep Apnea  Hx of Pulmonary Embolism  Hypothyroidism  The patient met criteria for discharge before the 2nd night of the stay. The patient was appropriately and safely discharged home with home PT.   H&P:  Pt is a 68y Female   PAST MEDICAL & SURGICAL HISTORY:  Arthritis      Glaucoma      Hyperlipidemia      Sleep apnea      Type II diabetes mellitus      Lower extremity edema      Stomach ulcer      Seasonal asthma      Pulmonary embolism      Hypothyroid      History of right oophorectomy           Now s/p Right Total Knee Arthroplasty. Pt is afebrile with stable vital signs. Pain is controlled. Alert and Oriented. Exam reveals intact EHL FHL TA GS, +DP. Dressing is clean and dry.    Hospital Course:  Patient presented to [facility] medically cleared for elective Right Total Knee Replacement Surgery, having failed outpatient conservative management. Prophylactic IV antibiotics were started before the procedure and continued for 24 hours post-op, then transitioned to PO antibiotics for remainder of hospital stay and continued for 7 days after discharge. They were admitted after surgery to the orthopedic floor. There were no complications during the hospital stay. All home medications were continued.     Routine consults were obtained from Physical Therapy for twice daily PT and from the Hospitalist for Medical Co-management. Patient was placed on anticoagulation. Pertinent home medications were continued. Daily labs were followed.      On POD 0 pt was stable overnight. No major events post-op. Pt received twice daily PT. The plan is for DC to home with home PT. The orthopedic Attending is aware and agrees.      The patient had no post-operative complications and clinically progressed faster than anticipated. The following condition(s) were actively treated during the hospital stay:  Diabetes  HLD  Asthma  Obstructive Sleep Apnea  Hx of Pulmonary Embolism  Hypothyroidism  The patient met criteria for discharge before the 2nd night of the stay. The patient was appropriately and safely discharged home with home PT.

## 2024-01-31 NOTE — PROGRESS NOTE ADULT - SUBJECTIVE AND OBJECTIVE BOX
Postop Check    Patient tolerated the procedure well. Patient seen and examined at bedside.  No acute complaints at this time. Pain well controlled. Denies chest pain, shortness of breath, nausea or vomiting.  ID: [id number].    PE:  Vital Signs Last 24 Hrs  T(C): 36.1 (01-31-24 @ 16:00), Max: 36.8 (01-31-24 @ 09:11)  T(F): 97 (01-31-24 @ 16:00), Max: 98.2 (01-31-24 @ 09:11)  HR: 54 (01-31-24 @ 16:00) (50 - 65)  BP: 116/80 (01-31-24 @ 16:00) (107/62 - 135/72)  BP(mean): --  RR: 12 (01-31-24 @ 16:00) (11 - 17)  SpO2: 100% (01-31-24 @ 16:00) (100% - 100%)    General: NAD, resting comfortably in bed  RLE:   Dressing in place C/D/I  SCD in place bilaterally  No calf tenderness   Motor: + EHL/FHL/TA/GSC  +SILT: SPN/DPN/Catrina/Saph/Tib  DP/PT 2+    A/P:  68y f s/p R TKA POD0    -PT/OT  -WBAT  -Pain Control  -DVT ppx starting POD1  -Continue perioperative abx x 24 hours  -FU AM Labs  -Rest, ice, compress and elevate the extremity as we needed  -Incentive Spirometry  -Medical management appreciated  -Dispo pending PT eval  -Will discuss plan with Dr. Singh and will advise changes to plan as needed

## 2024-01-31 NOTE — PHYSICAL THERAPY INITIAL EVALUATION ADULT - IMPAIRMENTS FOUND, PT EVAL
decreased knee control post-op in part due to residual effects of  anaesthesia/gait, locomotion, and balance/muscle strength/ROM

## 2024-01-31 NOTE — PHYSICAL THERAPY INITIAL EVALUATION ADULT - NSACTIVITYREC_GEN_A_PT
out of bed to armchair with R leg elevated in full passive EXT ,ICE frequently for pain/edema reduction, amb with RW to/from bathroom as needed

## 2024-01-31 NOTE — PATIENT PROFILE ADULT - FALL HARM RISK - HARM RISK INTERVENTIONS

## 2024-01-31 NOTE — PHYSICAL THERAPY INITIAL EVALUATION ADULT - PHYSICAL ASSIST/NONPHYSICAL ASSIST: GAIT, REHAB EVAL
PT manually stabilizing R knee on R stance phase/verbal cues/nonverbal cues (demo/gestures)/2 person assist

## 2024-01-31 NOTE — PHYSICAL THERAPY INITIAL EVALUATION ADULT - IMPAIRMENTS CONTRIBUTING TO GAIT DEVIATIONS, PT EVAL
R knee post-op/impaired motor control/decreased ROM/decreased sensation/impaired sensory feedback/decreased strength

## 2024-01-31 NOTE — DISCHARGE NOTE PROVIDER - NSDCMRMEDTOKEN_GEN_ALL_CORE_FT
Alphagan P 0.15% ophthalmic solution: 1 drop(s) in each affected eye 2 times a day  Aspirin 100mg once daily---directions per cardiology for surgery:   atorvastatin 20 mg oral tablet: 1 tab(s) orally once a day (at bedtime)  celecoxib 200 mg oral capsule: 1 cap(s) orally 2 times a day  dicyclomine 20 mg oral tablet: 1 tab(s) orally 3 times a day  dorzolamide-timolol 2.23%-0.68% (2%-0.5% base) ophthalmic solution: 1 drop(s) in each eye 2 times a day  Jardiance 10 mg oral tablet: 1 tab(s) orally once a day Instructed to HOLD 72 hrs prior to surgery  levothyroxine 50 mcg (0.05 mg) oral tablet: 1 tab(s) orally once a day  lifitegrast 5% ophthalmic solution: 1 drop(s) in each eye once a day  MetFORMIN (Eqv-Glucophage XR) 500 mg oral tablet, extended release: 1 tab(s) orally 2 times a day  Multiple Vitamins oral tablet: 1 tab(s) orally once a day  pantoprazole 40 mg oral delayed release tablet: 1 tab(s) orally once a day  Systane Complete Optimal Dry Eye Relief ophthalmic solution: 1 drop(s) in each eye 4 times a day  torsemide 5 mg oral tablet: 1 tab(s) orally once a day   Alphagan P 0.15% ophthalmic solution: 1 drop(s) in each affected eye 2 times a day  Aspirin EC 81 mg oral delayed release tablet: 1 tab(s) orally 2 times a day starting 2/14/24 up to and including 2/27/24 MDD: 2  atorvastatin 20 mg oral tablet: 1 tab(s) orally once a day (at bedtime)  cefadroxil 500 mg oral capsule: 1 cap(s) orally 2 times a day MDD: 2  celecoxib 200 mg oral capsule: 1 cap(s) orally 2 times a day  dicyclomine 20 mg oral tablet: 1 tab(s) orally 3 times a day  dorzolamide-timolol 2.23%-0.68% (2%-0.5% base) ophthalmic solution: 1 drop(s) in each eye 2 times a day  Jardiance 10 mg oral tablet: 1 tab(s) orally once a day Instructed to HOLD 72 hrs prior to surgery  levothyroxine 50 mcg (0.05 mg) oral tablet: 1 tab(s) orally once a day  lifitegrast 5% ophthalmic solution: 1 drop(s) in each eye once a day  MetFORMIN (Eqv-Glucophage XR) 500 mg oral tablet, extended release: 1 tab(s) orally 2 times a day  MiraLax oral powder for reconstitution: 17 gram(s) orally once a day as needed for  constipation  Multiple Vitamins oral tablet: 1 tab(s) orally once a day  ondansetron 4 mg oral tablet: 1 tab(s) orally every 8 hours as needed for  nausea MDD: 3  oxyCODONE 5 mg oral tablet: 1 tab(s) orally every 4 hours as needed for  severe pain MDD: 6  pantoprazole 40 mg oral delayed release tablet: 1 tab(s) orally once a day  Senna 8.6 mg oral tablet: 2 tab(s) orally once a day (at bedtime) as needed for  constipation  Systane Complete Optimal Dry Eye Relief ophthalmic solution: 1 drop(s) in each eye 4 times a day  torsemide 5 mg oral tablet: 1 tab(s) orally once a day  Xarelto 10 mg oral tablet: 1 tab(s) orally once a day with dinner up to and including 2/13/24. Safely dispose of any remaining pills after final dose. MDD: 1

## 2024-01-31 NOTE — PHYSICAL THERAPY INITIAL EVALUATION ADULT - PLANNED THERAPY INTERVENTIONS, PT EVAL
stair training, car transfers ,ICING R knee frequently/bed mobility training/gait training/ROM/strengthening/stretching/transfer training

## 2024-01-31 NOTE — PHYSICAL THERAPY INITIAL EVALUATION ADULT - NSPTDMEREC_GEN_A_CORE
daughter reports RW NOT delivered to home as of yet, will need to be issued prior to DC/rolling walker

## 2024-01-31 NOTE — CONSULT NOTE ADULT - NS ATTEND AMEND GEN_ALL_CORE FT
Patient seen and examined with HANSA Jeter.  I was physically present for the key portions of the evaluation and management (E/M) service provided.  I agree with the above history, physical, and plan which I have reviewed and edited where appropriate.    69 yo female with above pmh a/w:  # OA right knee  # S/P right TKR    POD#0  pain control  PT eval  Bowel regimen  IVF's  Finish ABXs  regular diet  PPI  VTE prophylaxis  monitor CBC/BMP    #DM, type 2  A1c 5.7  hold OHAS  bgm ss    #Glaucoma  cont eye gtts    # HLD  cont statin    # hypothyroidism  cont levothyroxine    #BLE edema  'hold torsemide for now    # h/o gastric ulcer  cont PPI    # Vte prophylaxis/ h/o PE  Cap score #11  Xarelto 10 mg x 12 days then transition back to ASA  Venodynes  INC mobility

## 2024-01-31 NOTE — DISCHARGE NOTE PROVIDER - NSDCHHHOMEBOUNDOTHER_GEN_ALL_CORE_FT
Pt did not have any GI concerns  and followed up today by mistake.  He will make a f/u appt 03/2021 for a surveillance colonoscopy.
Ataxic gait secondary to recent Right Total Knee Arthroplasty

## 2024-01-31 NOTE — PHYSICAL THERAPY INITIAL EVALUATION ADULT - LEVEL OF INDEPENDENCE: SIT/STAND, REHAB EVAL
c/o decreased sensation R knee with wobbliness/partial buckling/contact guard/minimum assist (75% patients effort)

## 2024-01-31 NOTE — DISCHARGE NOTE PROVIDER - NSDCFUADDINST_GEN_ALL_CORE_FT
Discharge Instructions for Right**//**Left Total Knee Arthroplasty:    1. ACTIVITY: WBAT, Rolling walker, Daily PT. Gentle ROM 0-full as tolerated. Walk plenty.  Keep a bump (rolled towel or blanket) under your heel to keep leg straight while in bed or chair for 2 weeks.  2. CALL WITH: fever over 101, wound redness, drainage or open area, calf pain/calf swelling  3. BANDAGE: Home PT to remove LONNIE NO SOONER than POD7 (2/7/24) and place a Mepilex Ag bandage over incision. May change sooner ONLY if LONNIE leaks or falls off. DO NOT REMOVE BANDAGE TO CHECK WOUND ON INTAKE. Dressing to be removed by Home PT on POD14 (2/14/24) and left open to air as long as the incision is dry; if there is continued drainage place a new dressing and instruct patient to call office and arrange follow up in the office on the next clinic day.  4. STAPLES: There are NO Staples to remove. Sutures are Dissolvable.  5. SHOWER: Okay to shower so long as battery pack is kept dry.  6. ANTIBIOTICS: Continue PO Duricef twice daily for 7 days upon discharge. eRx sent to the Pharmacy.   7. DVT PE Prophylaxis: Xarelto 10mg PO daily x 14 days followed by Aspirin 81mg PO BID x 14 days. See Med Rec.  8. GI: Continue Protonix daily while on Anticoagulant. eRx has been sent to your pharmacy.  9. FOLLOW UP: Dr. Singh in 21 days. Call office to schedule.   10. MEDICATIONS: If going home, eRX sent to your pharmacy for .   11. **Call office if medications not covered under your insurance , especially BLOOD CLOT PREVENTION/anticoagulant medication.    
No

## 2024-02-01 ENCOUNTER — TRANSCRIPTION ENCOUNTER (OUTPATIENT)
Age: 69
End: 2024-02-01

## 2024-02-01 VITALS
TEMPERATURE: 98 F | SYSTOLIC BLOOD PRESSURE: 111 MMHG | HEART RATE: 74 BPM | DIASTOLIC BLOOD PRESSURE: 55 MMHG | OXYGEN SATURATION: 96 % | RESPIRATION RATE: 16 BRPM

## 2024-02-01 LAB
ANION GAP SERPL CALC-SCNC: 5 MMOL/L — SIGNIFICANT CHANGE UP (ref 5–17)
BUN SERPL-MCNC: 22 MG/DL — SIGNIFICANT CHANGE UP (ref 7–23)
CALCIUM SERPL-MCNC: 8 MG/DL — LOW (ref 8.5–10.1)
CHLORIDE SERPL-SCNC: 110 MMOL/L — HIGH (ref 96–108)
CO2 SERPL-SCNC: 26 MMOL/L — SIGNIFICANT CHANGE UP (ref 22–31)
CREAT SERPL-MCNC: 1.02 MG/DL — SIGNIFICANT CHANGE UP (ref 0.5–1.3)
EGFR: 61 ML/MIN/1.73M2 — SIGNIFICANT CHANGE UP
GLUCOSE BLDC GLUCOMTR-MCNC: 144 MG/DL — HIGH (ref 70–99)
GLUCOSE BLDC GLUCOMTR-MCNC: 183 MG/DL — HIGH (ref 70–99)
GLUCOSE SERPL-MCNC: 159 MG/DL — HIGH (ref 70–99)
HCT VFR BLD CALC: 27.7 % — LOW (ref 34.5–45)
HGB BLD-MCNC: 8.8 G/DL — LOW (ref 11.5–15.5)
MCHC RBC-ENTMCNC: 20.6 PG — LOW (ref 27–34)
MCHC RBC-ENTMCNC: 31.8 GM/DL — LOW (ref 32–36)
MCV RBC AUTO: 64.9 FL — LOW (ref 80–100)
PLATELET # BLD AUTO: 240 K/UL — SIGNIFICANT CHANGE UP (ref 150–400)
POTASSIUM SERPL-MCNC: 3.8 MMOL/L — SIGNIFICANT CHANGE UP (ref 3.5–5.3)
POTASSIUM SERPL-SCNC: 3.8 MMOL/L — SIGNIFICANT CHANGE UP (ref 3.5–5.3)
RBC # BLD: 4.27 M/UL — SIGNIFICANT CHANGE UP (ref 3.8–5.2)
RBC # FLD: 20.7 % — HIGH (ref 10.3–14.5)
SODIUM SERPL-SCNC: 141 MMOL/L — SIGNIFICANT CHANGE UP (ref 135–145)
WBC # BLD: 7.54 K/UL — SIGNIFICANT CHANGE UP (ref 3.8–10.5)
WBC # FLD AUTO: 7.54 K/UL — SIGNIFICANT CHANGE UP (ref 3.8–10.5)

## 2024-02-01 PROCEDURE — 99232 SBSQ HOSP IP/OBS MODERATE 35: CPT

## 2024-02-01 RX ORDER — KIT FOR THE PREPARATION OF TECHNETIUM TC99M SESTAMIBI 1 MG/5ML
INJECTION, POWDER, LYOPHILIZED, FOR SOLUTION PARENTERAL
Refills: 0 | Status: COMPLETED | OUTPATIENT
Start: 2024-02-01

## 2024-02-01 RX ORDER — SODIUM CHLORIDE 9 MG/ML
500 INJECTION, SOLUTION INTRAVENOUS ONCE
Refills: 0 | Status: COMPLETED | OUTPATIENT
Start: 2024-02-01 | End: 2024-02-01

## 2024-02-01 RX ADMIN — Medication 1000 MILLIGRAM(S): at 05:53

## 2024-02-01 RX ADMIN — PANTOPRAZOLE SODIUM 40 MILLIGRAM(S): 20 TABLET, DELAYED RELEASE ORAL at 05:54

## 2024-02-01 RX ADMIN — CELECOXIB 200 MILLIGRAM(S): 200 CAPSULE ORAL at 05:51

## 2024-02-01 RX ADMIN — Medication 1 TABLET(S): at 11:47

## 2024-02-01 RX ADMIN — Medication 1 MILLIGRAM(S): at 11:48

## 2024-02-01 RX ADMIN — Medication 2000 MILLIGRAM(S): at 05:54

## 2024-02-01 RX ADMIN — KIT FOR THE PREPARATION OF TECHNETIUM TC99M SESTAMIBI 0: 1 INJECTION, POWDER, LYOPHILIZED, FOR SOLUTION PARENTERAL at 00:00

## 2024-02-01 RX ADMIN — SODIUM CHLORIDE 125 MILLILITER(S): 9 INJECTION INTRAMUSCULAR; INTRAVENOUS; SUBCUTANEOUS at 00:43

## 2024-02-01 RX ADMIN — Medication 50 MICROGRAM(S): at 05:52

## 2024-02-01 RX ADMIN — Medication 500 MILLIGRAM(S): at 05:53

## 2024-02-01 RX ADMIN — Medication 250 MILLIGRAM(S): at 05:54

## 2024-02-01 RX ADMIN — OXYCODONE HYDROCHLORIDE 5 MILLIGRAM(S): 5 TABLET ORAL at 06:23

## 2024-02-01 RX ADMIN — SODIUM CHLORIDE 1000 MILLILITER(S): 9 INJECTION, SOLUTION INTRAVENOUS at 10:00

## 2024-02-01 RX ADMIN — OXYCODONE HYDROCHLORIDE 5 MILLIGRAM(S): 5 TABLET ORAL at 05:53

## 2024-02-01 RX ADMIN — Medication 1 DROP(S): at 09:59

## 2024-02-01 RX ADMIN — Medication 20 MILLIGRAM(S): at 05:53

## 2024-02-01 RX ADMIN — DORZOLAMIDE HYDROCHLORIDE TIMOLOL MALEATE 1 DROP(S): 20; 5 SOLUTION/ DROPS OPHTHALMIC at 10:00

## 2024-02-01 RX ADMIN — Medication 1: at 11:30

## 2024-02-01 RX ADMIN — Medication 1 DROP(S): at 11:47

## 2024-02-01 RX ADMIN — Medication 250 MILLIGRAM(S): at 00:00

## 2024-02-01 RX ADMIN — Medication 325 MILLIGRAM(S): at 11:47

## 2024-02-01 RX ADMIN — Medication 250 MILLIGRAM(S): at 11:48

## 2024-02-01 RX ADMIN — BRIMONIDINE TARTRATE 1 DROP(S): 2 SOLUTION/ DROPS OPHTHALMIC at 10:00

## 2024-02-01 NOTE — DISCHARGE NOTE NURSING/CASE MANAGEMENT/SOCIAL WORK - PATIENT PORTAL LINK FT
You can access the FollowMyHealth Patient Portal offered by [facility] by registering at the following website: [email or web address]. By joining [facility]’s FollowMyHealth portal, you will also be able to view your health information using other applications (apps) compatible with our system.

## 2024-02-01 NOTE — PROGRESS NOTE ADULT - SUBJECTIVE AND OBJECTIVE BOX
No acute complaints at this time. Pain partially controlled. Denies chest pain, shortness of breath, nausea or vomiting. Daughter at bedside to help provide history.    LABS:                        10.5   6.89  )-----------( 279      ( 31 Jan 2024 15:13 )             32.7     01-31    137  |  109<H>  |  15  ----------------------------<  137<H>  4.2   |  26  |  0.82    Ca    8.9      31 Jan 2024 15:13        Urinalysis Basic - ( 31 Jan 2024 15:13 )    Color: x / Appearance: x / SG: x / pH: x  Gluc: 137 mg/dL / Ketone: x  / Bili: x / Urobili: x   Blood: x / Protein: x / Nitrite: x   Leuk Esterase: x / RBC: x / WBC x   Sq Epi: x / Non Sq Epi: x / Bacteria: x        VITAL SIGNS:  T(C): 36.7 (02-01-24 @ 04:03), Max: 36.8 (01-31-24 @ 09:11)  HR: 70 (02-01-24 @ 04:03) (50 - 70)  BP: 92/57 (02-01-24 @ 04:03) (92/53 - 135/72)  RR: 17 (02-01-24 @ 04:03) (11 - 17)  SpO2: 94% (02-01-24 @ 04:03) (94% - 100%)    General: NAD, resting comfortably in bed  RLE:   Dressing in place C/D/I  SCD in place bilaterally  No calf tenderness   Motor: + EHL/FHL/TA/GSC  +SILT: SPN/DPN/Catrina/Saph/Tib  DP/PT +    A/P:  68y f s/p R TKA POD1    -PT/OT  -WBAT  -Pain Control  -DVT ppx    -Continue perioperative abx x 24 hours  -FU AM Labs  -Rest, ice, compress and elevate the extremity as we needed  -Incentive Spirometry  -Medical management appreciated  -Dispo pending PT eval  -Will discuss plan with Dr. Singh and will advise changes to plan as needed

## 2024-02-01 NOTE — DISCHARGE NOTE NURSING/CASE MANAGEMENT/SOCIAL WORK - NSDCPEFALRISK_GEN_ALL_CORE
For information on Fall & Injury Prevention, visit: [email or web address] OR  [email or web address] OR  https://www.cdc.gov/steadi/patient.html

## 2024-02-01 NOTE — PROGRESS NOTE ADULT - SUBJECTIVE AND OBJECTIVE BOX
PCP: Dr. Britton Ceron    CHIEF COMPLAINT: right knee OA    HISTORY OF THE PRESENT ILLNESS: this is a  67 yo female who speaks Albanian only, with PMH of  glaucoma, HLD, hypothyroidism, B/L LE edema, h/o PE after first covid vaccine per daughter, in 2020, was on DOAC, no longer on AC, only ASA, LEMEUL, gastric ulcer, DM type 2 and  OA  of her  B/L knees with right knee pain > left, tx conservatively , however the pain has become worse over the last few months and she has now opted for orthopedic surgery.     We are consulted for medical management    2/1: pt see at bedside, events noted from last night, had episode of dizziness and sob, hypotension, resolved with fluid bolus, denies any cp or sob now  REVIEW OF SYSTEMS:   All 10 systems reviewed in detailed and found to be negative with the exception of what has already been described above    Vital Signs Last 24 Hrs  T(C): 36.8 (02-01-24 @ 07:43), Max: 36.8 (02-01-24 @ 00:13)  T(F): 98.2 (02-01-24 @ 07:43), Max: 98.2 (02-01-24 @ 00:13)  HR: 65 (02-01-24 @ 07:43) (50 - 70)  BP: 90/59 (02-01-24 @ 07:43) (90/59 - 135/72)  BP(mean): --  RR: 16 (02-01-24 @ 07:43) (11 - 17)  SpO2: 95% (02-01-24 @ 07:43) (94% - 100%)    MEDICATIONS  (STANDING):  acetaminophen     Tablet .. 1000 milliGRAM(s) Oral every 8 hours  artificial tears (preservative free) Ophthalmic Solution 1 Drop(s) Both EYES four times a day  ascorbic acid 500 milliGRAM(s) Oral two times a day  atorvastatin 20 milliGRAM(s) Oral at bedtime  brimonidine 0.2% Ophthalmic Solution 1 Drop(s) Both EYES two times a day  celecoxib 200 milliGRAM(s) Oral every 12 hours  cephalexin 250 milliGRAM(s) Oral every 6 hours  dextrose 5%. 1000 milliLiter(s) (50 mL/Hr) IV Continuous <Continuous>  dextrose 5%. 1000 milliLiter(s) (100 mL/Hr) IV Continuous <Continuous>  dextrose 50% Injectable 12.5 Gram(s) IV Push once  dextrose 50% Injectable 25 Gram(s) IV Push once  dextrose 50% Injectable 25 Gram(s) IV Push once  dicyclomine 20 milliGRAM(s) Oral three times a day before meals  dorzolamide 2%/timolol 0.5% Ophthalmic Solution 1 Drop(s) Both EYES two times a day  ferrous    sulfate 325 milliGRAM(s) Oral daily  folic acid 1 milliGRAM(s) Oral daily  glucagon  Injectable 1 milliGRAM(s) IntraMuscular once  insulin lispro (ADMELOG) corrective regimen sliding scale   SubCutaneous three times a day before meals  insulin lispro (ADMELOG) corrective regimen sliding scale   SubCutaneous at bedtime  levothyroxine 50 MICROGram(s) Oral daily  multivitamin 1 Tablet(s) Oral daily  pantoprazole    Tablet 40 milliGRAM(s) Oral before breakfast  polyethylene glycol 3350 17 Gram(s) Oral at bedtime  rivaroxaban 10 milliGRAM(s) Oral with dinner  senna 2 Tablet(s) Oral at bedtime  sodium chloride 0.9%. 1000 milliLiter(s) (125 mL/Hr) IV Continuous <Continuous>  tranexamic acid 2% Topical Irrigation 1 Application(s) IntraArticular once    MEDICATIONS  (PRN):  dextrose Oral Gel 15 Gram(s) Oral once PRN Blood Glucose LESS THAN 70 milliGRAM(s)/deciliter  HYDROmorphone  Injectable 0.5 milliGRAM(s) SubCutaneous every 4 hours PRN Severe Pain (7 - 10)  ondansetron Injectable 8 milliGRAM(s) IV Push every 8 hours PRN Nausea and/or Vomiting  oxyCODONE    IR 5 milliGRAM(s) Oral every 4 hours PRN Mild Pain (1 - 3)  oxyCODONE    IR 10 milliGRAM(s) Oral every 4 hours PRN Moderate Pain (4 - 6)  prochlorperazine   Injectable 10 milliGRAM(s) IV Push every 8 hours PRN Nausea and/or Vomiting    PHYSICAL EXAM:    GENERAL: Comfortable, no acute distress   HEAD:  Normocephalic, atraumatic  EYES: EOMI, PERRLA  HEENT: Moist mucous membranes  NECK: Supple, No JVD  NERVOUS SYSTEM:  Alert & Oriented X3, Motor Strength 5/5 B/L upper and lower extremities  CHEST/LUNG: Clear to auscultation bilaterally  HEART: Regular rate and rhythm  ABDOMEN: Soft, non tender, Nondistended, Bowel sounds present  GENITOURINARY: Voiding, no palpable bladder  EXTREMITIES:   No clubbing, cyanosis, or edema  MUSCULOSKELETAL- right knee ace dsg c/d/i  SKIN-no rash      LABS:                         8.8    7.54  )-----------( 240      ( 01 Feb 2024 08:03 )             27.7       02-01    141  |  110<H>  |  22  ----------------------------<  159<H>  3.8   |  26  |  1.02    Ca    8.0<L>      01 Feb 2024 08:03                IMPRESSION: 67 yo female with above pmh a/w:  # OA right knee  # S/P right TKR    POD#1  pain control  PT eval  Bowel regimen  IVF's  Finish ABXs  DASH diet  PPI  VTE prophylaxis  monitor CBC/BMP    # acute blood loss anemia   d/t surgery  hgb 10.5--->8.8  monitor hh  500cc IVF per ortho    #DM, type 2  A1c 5.7  hold OHAS  bgm ss    #Glaucoma  cont eye gtts    # HLD  cont statin    # hypothyroidism  cont levothyroxine    #BLE edema  'hold torsemide for now    # h/o gastric ulcer  cont PPI    # Vte prophylaxis/ h/o PE  Cap score #11  Xarelto 10 mg x 12 days then transition back to ASA  Venodynes  INC mobility      dispo; home 1- 2 days         PCP: Dr. Britton Ceron    CHIEF COMPLAINT: right knee OA    HISTORY OF THE PRESENT ILLNESS: this is a  67 yo female who speaks Lao only, with PMH of  glaucoma, HLD, hypothyroidism, B/L LE edema, h/o PE after first covid vaccine per daughter, in 2020, was on DOAC, no longer on AC, only ASA, LEMUEL, gastric ulcer, DM type 2 and  OA  of her  B/L knees with right knee pain > left, tx conservatively , however the pain has become worse over the last few months and she has now opted for orthopedic surgery.     We are consulted for medical management    2/1: pt see at bedside, events noted from last night, had episode of dizziness and sob, hypotension, resolved with fluid bolus, denies any cp or sob now  REVIEW OF SYSTEMS:   All 10 systems reviewed in detailed and found to be negative with the exception of what has already been described above    Vital Signs Last 24 Hrs  T(C): 36.8 (02-01-24 @ 07:43), Max: 36.8 (02-01-24 @ 00:13)  T(F): 98.2 (02-01-24 @ 07:43), Max: 98.2 (02-01-24 @ 00:13)  HR: 65 (02-01-24 @ 07:43) (50 - 70)  BP: 90/59 (02-01-24 @ 07:43) (90/59 - 135/72)  BP(mean): --  RR: 16 (02-01-24 @ 07:43) (11 - 17)  SpO2: 95% (02-01-24 @ 07:43) (94% - 100%)    MEDICATIONS  (STANDING):  acetaminophen     Tablet .. 1000 milliGRAM(s) Oral every 8 hours  artificial tears (preservative free) Ophthalmic Solution 1 Drop(s) Both EYES four times a day  ascorbic acid 500 milliGRAM(s) Oral two times a day  atorvastatin 20 milliGRAM(s) Oral at bedtime  brimonidine 0.2% Ophthalmic Solution 1 Drop(s) Both EYES two times a day  celecoxib 200 milliGRAM(s) Oral every 12 hours  cephalexin 250 milliGRAM(s) Oral every 6 hours  dextrose 5%. 1000 milliLiter(s) (50 mL/Hr) IV Continuous <Continuous>  dextrose 5%. 1000 milliLiter(s) (100 mL/Hr) IV Continuous <Continuous>  dextrose 50% Injectable 12.5 Gram(s) IV Push once  dextrose 50% Injectable 25 Gram(s) IV Push once  dextrose 50% Injectable 25 Gram(s) IV Push once  dicyclomine 20 milliGRAM(s) Oral three times a day before meals  dorzolamide 2%/timolol 0.5% Ophthalmic Solution 1 Drop(s) Both EYES two times a day  ferrous    sulfate 325 milliGRAM(s) Oral daily  folic acid 1 milliGRAM(s) Oral daily  glucagon  Injectable 1 milliGRAM(s) IntraMuscular once  insulin lispro (ADMELOG) corrective regimen sliding scale   SubCutaneous three times a day before meals  insulin lispro (ADMELOG) corrective regimen sliding scale   SubCutaneous at bedtime  levothyroxine 50 MICROGram(s) Oral daily  multivitamin 1 Tablet(s) Oral daily  pantoprazole    Tablet 40 milliGRAM(s) Oral before breakfast  polyethylene glycol 3350 17 Gram(s) Oral at bedtime  rivaroxaban 10 milliGRAM(s) Oral with dinner  senna 2 Tablet(s) Oral at bedtime  sodium chloride 0.9%. 1000 milliLiter(s) (125 mL/Hr) IV Continuous <Continuous>  tranexamic acid 2% Topical Irrigation 1 Application(s) IntraArticular once    MEDICATIONS  (PRN):  dextrose Oral Gel 15 Gram(s) Oral once PRN Blood Glucose LESS THAN 70 milliGRAM(s)/deciliter  HYDROmorphone  Injectable 0.5 milliGRAM(s) SubCutaneous every 4 hours PRN Severe Pain (7 - 10)  ondansetron Injectable 8 milliGRAM(s) IV Push every 8 hours PRN Nausea and/or Vomiting  oxyCODONE    IR 5 milliGRAM(s) Oral every 4 hours PRN Mild Pain (1 - 3)  oxyCODONE    IR 10 milliGRAM(s) Oral every 4 hours PRN Moderate Pain (4 - 6)  prochlorperazine   Injectable 10 milliGRAM(s) IV Push every 8 hours PRN Nausea and/or Vomiting    PHYSICAL EXAM:    GENERAL: Comfortable, no acute distress   HEAD:  Normocephalic, atraumatic  EYES: EOMI, PERRLA  HEENT: Moist mucous membranes  NECK: Supple, No JVD  NERVOUS SYSTEM:  Alert & Oriented X3, Motor Strength 5/5 B/L upper and lower extremities  CHEST/LUNG: Clear to auscultation bilaterally  HEART: Regular rate and rhythm  ABDOMEN: Soft, non tender, Nondistended, Bowel sounds present  GENITOURINARY: Voiding, no palpable bladder  EXTREMITIES:   No clubbing, cyanosis, or edema  MUSCULOSKELETAL- right knee ace dsg c/d/i  SKIN-no rash      LABS:                         8.8    7.54  )-----------( 240      ( 01 Feb 2024 08:03 )             27.7       02-01    141  |  110<H>  |  22  ----------------------------<  159<H>  3.8   |  26  |  1.02    Ca    8.0<L>      01 Feb 2024 08:03                IMPRESSION: 67 yo female with above pmh a/w:  # OA right knee  # S/P right TKR    POD#1  pain control  PT eval  Bowel regimen  IVF's  Finish ABXs  DASH diet  PPI  VTE prophylaxis  monitor CBC/BMP    # acute blood loss anemia   d/t surgery  hgb 10.5--->8.8  monitor hh  500cc IVF per ortho      # hypotension  asymptomatic   likely from hypovolemia  500cc IVF bolus     #DM, type 2  A1c 5.7  hold OHAS  bgm ss    #Glaucoma  cont eye gtts    # HLD  cont statin    # hypothyroidism  cont levothyroxine    #BLE edema  'hold torsemide for now    # h/o gastric ulcer  cont PPI    # Vte prophylaxis/ h/o PE  Cap score #11  Xarelto 10 mg x 12 days then transition back to ASA  Venodynes  INC mobility      dispo; home 1- 2 days         PCP: Dr. Britton Ceron    CHIEF COMPLAINT: right knee OA    HISTORY OF THE PRESENT ILLNESS: this is a  69 yo female who speaks Romanian only, with PMH of  glaucoma, HLD, hypothyroidism, B/L LE edema, h/o PE after first covid vaccine per daughter, in 2020, was on DOAC, no longer on AC, only ASA, LEMUEL, gastric ulcer, DM type 2 and  OA  of her  B/L knees with right knee pain > left, tx conservatively , however the pain has become worse over the last few months and she has now opted for orthopedic surgery.     We are consulted for medical management    2/1: pt see at bedside, events noted from last night, had episode of dizziness and sob, hypotension, resolved with fluid bolus, denies any cp or sob now  REVIEW OF SYSTEMS:   All 10 systems reviewed in detailed and found to be negative with the exception of what has already been described above    Vital Signs Last 24 Hrs  T(C): 36.8 (02-01-24 @ 07:43), Max: 36.8 (02-01-24 @ 00:13)  T(F): 98.2 (02-01-24 @ 07:43), Max: 98.2 (02-01-24 @ 00:13)  HR: 65 (02-01-24 @ 07:43) (50 - 70)  BP: 90/59 (02-01-24 @ 07:43) (90/59 - 135/72)  BP(mean): --  RR: 16 (02-01-24 @ 07:43) (11 - 17)  SpO2: 95% (02-01-24 @ 07:43) (94% - 100%)    MEDICATIONS  (STANDING):  acetaminophen     Tablet .. 1000 milliGRAM(s) Oral every 8 hours  artificial tears (preservative free) Ophthalmic Solution 1 Drop(s) Both EYES four times a day  ascorbic acid 500 milliGRAM(s) Oral two times a day  atorvastatin 20 milliGRAM(s) Oral at bedtime  brimonidine 0.2% Ophthalmic Solution 1 Drop(s) Both EYES two times a day  celecoxib 200 milliGRAM(s) Oral every 12 hours  cephalexin 250 milliGRAM(s) Oral every 6 hours  dextrose 5%. 1000 milliLiter(s) (50 mL/Hr) IV Continuous <Continuous>  dextrose 5%. 1000 milliLiter(s) (100 mL/Hr) IV Continuous <Continuous>  dextrose 50% Injectable 12.5 Gram(s) IV Push once  dextrose 50% Injectable 25 Gram(s) IV Push once  dextrose 50% Injectable 25 Gram(s) IV Push once  dicyclomine 20 milliGRAM(s) Oral three times a day before meals  dorzolamide 2%/timolol 0.5% Ophthalmic Solution 1 Drop(s) Both EYES two times a day  ferrous    sulfate 325 milliGRAM(s) Oral daily  folic acid 1 milliGRAM(s) Oral daily  glucagon  Injectable 1 milliGRAM(s) IntraMuscular once  insulin lispro (ADMELOG) corrective regimen sliding scale   SubCutaneous three times a day before meals  insulin lispro (ADMELOG) corrective regimen sliding scale   SubCutaneous at bedtime  levothyroxine 50 MICROGram(s) Oral daily  multivitamin 1 Tablet(s) Oral daily  pantoprazole    Tablet 40 milliGRAM(s) Oral before breakfast  polyethylene glycol 3350 17 Gram(s) Oral at bedtime  rivaroxaban 10 milliGRAM(s) Oral with dinner  senna 2 Tablet(s) Oral at bedtime  sodium chloride 0.9%. 1000 milliLiter(s) (125 mL/Hr) IV Continuous <Continuous>  tranexamic acid 2% Topical Irrigation 1 Application(s) IntraArticular once    MEDICATIONS  (PRN):  dextrose Oral Gel 15 Gram(s) Oral once PRN Blood Glucose LESS THAN 70 milliGRAM(s)/deciliter  HYDROmorphone  Injectable 0.5 milliGRAM(s) SubCutaneous every 4 hours PRN Severe Pain (7 - 10)  ondansetron Injectable 8 milliGRAM(s) IV Push every 8 hours PRN Nausea and/or Vomiting  oxyCODONE    IR 5 milliGRAM(s) Oral every 4 hours PRN Mild Pain (1 - 3)  oxyCODONE    IR 10 milliGRAM(s) Oral every 4 hours PRN Moderate Pain (4 - 6)  prochlorperazine   Injectable 10 milliGRAM(s) IV Push every 8 hours PRN Nausea and/or Vomiting    PHYSICAL EXAM:    GENERAL: Comfortable, no acute distress   HEAD:  Normocephalic, atraumatic  EYES: EOMI, PERRLA  HEENT: Moist mucous membranes  NECK: Supple, No JVD  NERVOUS SYSTEM:  Alert & Oriented X3, Motor Strength 5/5 B/L upper and lower extremities  CHEST/LUNG: Clear to auscultation bilaterally  HEART: Regular rate and rhythm  ABDOMEN: Soft, non tender, Nondistended, Bowel sounds present  GENITOURINARY: Voiding, no palpable bladder  EXTREMITIES:   No clubbing, cyanosis, or edema  MUSCULOSKELETAL- right knee ace dsg c/d/i  SKIN-no rash      LABS:                         8.8    7.54  )-----------( 240      ( 01 Feb 2024 08:03 )             27.7       02-01    141  |  110<H>  |  22  ----------------------------<  159<H>  3.8   |  26  |  1.02    Ca    8.0<L>      01 Feb 2024 08:03                IMPRESSION: 69 yo female with above pmh a/w:  # OA right knee  # S/P right TKR    POD#1  pain control  PT eval  Bowel regimen  IVF's  CCD/low fat diet  PPI  VTE prophylaxis  monitor CBC/BMP    # acute blood loss anemia   d/t surgery  hgb 10.5--->8.8  monitor hh  500cc IVF per ortho      # hypotension  asymptomatic   likely from hypovolemia  500cc IVF bolus     #DM, type 2  A1c 5.7  hold OHAS  bgm ss    #Glaucoma  cont eye gtts    # HLD  cont statin    # hypothyroidism  cont levothyroxine    #BLE edema  'hold torsemide for now    # h/o gastric ulcer  cont PPI    # Vte prophylaxis/ h/o PE  Cap score #11  Xarelto 10 mg x 12 days then transition back to ASA  Venodynes  INC mobility      dispo; home 1- 2 days

## 2024-02-02 ENCOUNTER — TRANSCRIPTION ENCOUNTER (OUTPATIENT)
Age: 69
End: 2024-02-02

## 2024-02-02 LAB — SURGICAL PATHOLOGY STUDY: SIGNIFICANT CHANGE UP

## 2024-02-05 DIAGNOSIS — E66.9 OBESITY, UNSPECIFIED: ICD-10-CM

## 2024-02-05 DIAGNOSIS — E78.5 HYPERLIPIDEMIA, UNSPECIFIED: ICD-10-CM

## 2024-02-05 DIAGNOSIS — G47.33 OBSTRUCTIVE SLEEP APNEA (ADULT) (PEDIATRIC): ICD-10-CM

## 2024-02-05 DIAGNOSIS — M81.0 AGE-RELATED OSTEOPOROSIS WITHOUT CURRENT PATHOLOGICAL FRACTURE: ICD-10-CM

## 2024-02-05 DIAGNOSIS — E03.9 HYPOTHYROIDISM, UNSPECIFIED: ICD-10-CM

## 2024-02-05 DIAGNOSIS — K21.9 GASTRO-ESOPHAGEAL REFLUX DISEASE WITHOUT ESOPHAGITIS: ICD-10-CM

## 2024-02-05 DIAGNOSIS — H40.9 UNSPECIFIED GLAUCOMA: ICD-10-CM

## 2024-02-05 DIAGNOSIS — J45.909 UNSPECIFIED ASTHMA, UNCOMPLICATED: ICD-10-CM

## 2024-02-05 DIAGNOSIS — M17.10 UNILATERAL PRIMARY OSTEOARTHRITIS, UNSPECIFIED KNEE: ICD-10-CM

## 2024-02-05 DIAGNOSIS — E11.9 TYPE 2 DIABETES MELLITUS WITHOUT COMPLICATIONS: ICD-10-CM

## 2024-02-05 DIAGNOSIS — R60.0 LOCALIZED EDEMA: ICD-10-CM

## 2024-02-05 DIAGNOSIS — Z86.711 PERSONAL HISTORY OF PULMONARY EMBOLISM: ICD-10-CM

## 2024-02-15 RX ORDER — OXYCODONE 5 MG/1
5 TABLET ORAL
Qty: 28 | Refills: 0 | Status: ACTIVE | COMMUNITY
Start: 2024-02-06 | End: 1900-01-01

## 2024-02-22 ENCOUNTER — APPOINTMENT (OUTPATIENT)
Dept: ORTHOPEDIC SURGERY | Facility: CLINIC | Age: 69
End: 2024-02-22
Payer: MEDICAID

## 2024-02-22 VITALS
HEIGHT: 58 IN | SYSTOLIC BLOOD PRESSURE: 125 MMHG | DIASTOLIC BLOOD PRESSURE: 76 MMHG | HEART RATE: 85 BPM | BODY MASS INDEX: 39.25 KG/M2 | WEIGHT: 187 LBS

## 2024-02-22 DIAGNOSIS — M54.16 RADICULOPATHY, LUMBAR REGION: ICD-10-CM

## 2024-02-22 PROCEDURE — 99024 POSTOP FOLLOW-UP VISIT: CPT

## 2024-02-22 RX ORDER — OXYCODONE 5 MG/1
5 TABLET ORAL
Qty: 28 | Refills: 0 | Status: ACTIVE | COMMUNITY
Start: 2024-02-22 | End: 1900-01-01

## 2024-02-22 NOTE — HISTORY OF PRESENT ILLNESS
[TextEntry] : Patient here for follow-up status post right total knee replacement on 1/31/2024.  Is taking oxycodone for pain.  Is on aspirin for DVT prophylaxis.  Ambulating with a cane.  States her allergies bother her although steroid injections last time did not provide much relief.  Also complaining of some right hip pain that radiates down to her knee.  Does have a history of lumbar surgery.  He is going to [place] at the end of March for about 4 months.  States she may want the other knee done when she returns.  Right knee Incision healing well without signs of infection ROM 0-100 Stable to varus/valgus stress Minimal AP laxity in flexion Neurovascularly intact  Assessment/plan Patient doing well.  Continue physical therapy/encourage ambulation, prescription for outpatient physical therapy given.  Encouraged working on ROM.  Pain control as needed.  Patient to follow-up in 3 weeks.

## 2024-02-26 ENCOUNTER — RX RENEWAL (OUTPATIENT)
Age: 69
End: 2024-02-26

## 2024-02-26 RX ORDER — BUDESONIDE AND FORMOTEROL FUMARATE DIHYDRATE 160; 4.5 UG/1; UG/1
160-4.5 AEROSOL RESPIRATORY (INHALATION)
Qty: 1 | Refills: 3 | Status: ACTIVE | COMMUNITY
Start: 2023-09-01 | End: 1900-01-01

## 2024-03-18 RX ORDER — TRAMADOL HYDROCHLORIDE 50 MG/1
50 TABLET, COATED ORAL
Qty: 28 | Refills: 0 | Status: ACTIVE | COMMUNITY
Start: 2024-03-18 | End: 1900-01-01

## 2024-03-21 ENCOUNTER — APPOINTMENT (OUTPATIENT)
Dept: ORTHOPEDIC SURGERY | Facility: CLINIC | Age: 69
End: 2024-03-21
Payer: MEDICAID

## 2024-03-21 DIAGNOSIS — Z96.651 PRESENCE OF RIGHT ARTIFICIAL KNEE JOINT: ICD-10-CM

## 2024-03-21 PROCEDURE — 73562 X-RAY EXAM OF KNEE 3: CPT | Mod: RT

## 2024-03-21 PROCEDURE — 99024 POSTOP FOLLOW-UP VISIT: CPT

## 2024-03-21 RX ORDER — TRAMADOL HYDROCHLORIDE 50 MG/1
50 TABLET, COATED ORAL
Qty: 60 | Refills: 0 | Status: ACTIVE | COMMUNITY
Start: 2024-03-21 | End: 1900-01-01

## 2024-03-21 RX ORDER — CELECOXIB 200 MG/1
200 CAPSULE ORAL TWICE DAILY
Qty: 120 | Refills: 1 | Status: ACTIVE | COMMUNITY
Start: 2024-02-22 | End: 1900-01-01

## 2024-03-21 NOTE — HISTORY OF PRESENT ILLNESS
[TextEntry] : Patient here for follow-up status post right total knee replacement on 1/31/2024.  Is taking tramadol and Celebrex for pain.  Ambulating with a cane.  States the knee feels much better than prior to surgery.  Does have some soreness on the medial aspect.  She is going to [place] for a month.  Right knee Incision healing well without signs of infection ROM 0-110 Stable to varus/valgus stress Minimal AP laxity in flexion Neurovascularly intact  3 views right knee-hardware in good alignment  Assessment/plan Patient doing well.  Continue physical therapy/encourage ambulation, new prescription for outpatient physical therapy given.  Encouraged working on ROM.  Pain control as needed.  Refill on Celebrex and tramadol given to patient to take on her trip to [place].  Patient to follow-up after her trip to [place]

## 2024-03-29 ENCOUNTER — NON-APPOINTMENT (OUTPATIENT)
Age: 69
End: 2024-03-29

## 2024-03-29 ENCOUNTER — APPOINTMENT (OUTPATIENT)
Dept: OPHTHALMOLOGY | Facility: CLINIC | Age: 69
End: 2024-03-29
Payer: MEDICAID

## 2024-03-29 PROCEDURE — 92012 INTRM OPH EXAM EST PATIENT: CPT

## 2024-04-02 ENCOUNTER — APPOINTMENT (OUTPATIENT)
Dept: CARDIOLOGY | Facility: CLINIC | Age: 69
End: 2024-04-02
Payer: MEDICAID

## 2024-04-02 PROCEDURE — 93306 TTE W/DOPPLER COMPLETE: CPT

## 2024-04-04 ENCOUNTER — TRANSCRIPTION ENCOUNTER (OUTPATIENT)
Age: 69
End: 2024-04-04

## 2024-04-25 RX ORDER — METFORMIN ER 500 MG 500 MG/1
500 TABLET ORAL
Qty: 2 | Refills: 1 | Status: ACTIVE | COMMUNITY
Start: 2023-11-01 | End: 1900-01-01

## 2024-04-26 RX ORDER — EMPAGLIFLOZIN 10 MG/1
10 TABLET, FILM COATED ORAL DAILY
Qty: 1 | Refills: 1 | Status: ACTIVE | COMMUNITY
Start: 2023-11-01 | End: 1900-01-01

## 2024-04-29 ENCOUNTER — APPOINTMENT (OUTPATIENT)
Dept: CARDIOLOGY | Facility: CLINIC | Age: 69
End: 2024-04-29

## 2024-06-06 ENCOUNTER — RX RENEWAL (OUTPATIENT)
Age: 69
End: 2024-06-06

## 2024-06-06 RX ORDER — ATORVASTATIN CALCIUM 20 MG/1
20 TABLET, FILM COATED ORAL
Qty: 90 | Refills: 1 | Status: ACTIVE | COMMUNITY
Start: 2023-05-11 | End: 1900-01-01

## 2024-09-20 ENCOUNTER — APPOINTMENT (OUTPATIENT)
Dept: OPHTHALMOLOGY | Facility: CLINIC | Age: 69
End: 2024-09-20

## 2024-09-26 ENCOUNTER — APPOINTMENT (OUTPATIENT)
Dept: ENDOCRINOLOGY | Facility: CLINIC | Age: 69
End: 2024-09-26
Payer: MEDICAID

## 2024-09-26 DIAGNOSIS — E11.9 TYPE 2 DIABETES MELLITUS W/OUT COMPLICATIONS: ICD-10-CM

## 2024-09-26 DIAGNOSIS — E03.9 HYPOTHYROIDISM, UNSPECIFIED: ICD-10-CM

## 2024-09-26 PROCEDURE — 99214 OFFICE O/P EST MOD 30 MIN: CPT

## 2024-09-26 NOTE — HISTORY OF PRESENT ILLNESS
[FreeTextEntry1] : Ms. Loyd is presenting for follow up of Type 2 Diabetes and Hypothyroidism.  Daughter present, refuses . Speaks Sinhala.   68 year old female with PMH of Type 2 Diabetes since ~age 62, Hypothyroidism (Was on levothyroxine 50mcg for 3 years, stopped 2 months ago), GERD, BMI 32. Planned right TKR tentatively for Jan 2024.   Reports no microvascular complications, no history of retinopathy, nephropathy or neuropathy.  Reports no history of cardiovascular risk factors, no history of CAD, CHF or CVA in the past. Possible CAD per cardio note.   Current DM meds: Synjardy XR 12.5/1000mg QD (No h/o UTI) - notes it is very expensive.  Prior DM meds:  Other pertinent meds:   POCT A1c%: 6.0% May 2023 --> 5.7% Jan 2024  POCT BG:   FSG: Checks 2x daily  Pre-Breakfast: 140s to 170s  2 hour PP: 190 up to 220.  Hypoglycemic episodes: No   Diet: No appetite. Daughter notes she loves carbs.  Breakfast: toast with cheese or lettuce with coffee with splenda  Lunch: skip or any protein with rice or potatoes.  Dinner: Fruit or yogurt Snacks: Fruit: Apple, tangerine, grapes, banana. Has 1-2 servings per day. Juice only when dizzy.   Exercise: None   Urine micro: Nov 2023 wnl ACE: C-peptide:  Cr: 0.83 GFR:  Lipid profile: LDL 64, TGs 73 June 2023 Statin: Lipitor 20mg  HbA1c%:  Ophthalmology: June 2023 Neuropathy: None  Podiatry/Diabetic foot exam:   #Hypothyroidism  -Patient diagnosed a few years ago Was on LT4 for 3 years and then stopped a few months ago.   Interval hx:  Bone density not done. SMBG fastings and post prandial are elevated.  Patient is going to [place] Dec 1 for 6 months.

## 2024-09-26 NOTE — REASON FOR VISIT
[Home] : at home, [unfilled] , at the time of the visit. [Patient] : the patient [Follow - Up] : a follow-up visit [Other Location: e.g. Home (Enter Location, City,State)___] : at [unfilled]

## 2024-09-26 NOTE — ASSESSMENT
[Diabetes Foot Care] : diabetes foot care [Long Term Vascular Complications] : long term vascular complications of diabetes [Carbohydrate Consistent Diet] : carbohydrate consistent diet [Importance of Diet and Exercise] : importance of diet and exercise to improve glycemic control, achieve weight loss and improve cardiovascular health [Exercise/Effect on Glucose] : exercise/effect on glucose [Self Monitoring of Blood Glucose] : self monitoring of blood glucose [Retinopathy Screening] : Patient was referred to ophthalmology for retinopathy screening [FreeTextEntry1] : 68 year old female with PMH of Type 2 Diabetes since ~age 62, Hypothyroidism (Was on levothyroxine 50mcg for 3 years, stopped 2 months ago), GERD, BMI 32. Planned right TKR tentatively for Jan 2024 is presenting for Type 2 Diabetes and Hypothyroidism.  1. Type 2 diabetes mellitus. HbA1c 6.0% May 2023 --> 5.7% Jan 2024  -We discussed the cardiovascular and microvascular complications of uncontrolled diabetes. We discussed the importance of diet and exercise and lifestyle modification for glycemic control and weight loss. We discussed pharmacologic options for glycemic control and weight loss. -Continue Jardiance 10mg QD  -Continue metformin, increase to 500mg 2 tabs BID.  -Discussed previously briefly GLP-1 use. Pt may consider it in future.  -LDL a goal <70, continue statin  -Urine ACR Nov 2023   -Opthal UTD  -Foot care discussed   2. Hypothyroidism  -Nov 2023 TSH wnl  -C/w levothyroxine 50mcg QD   3. Screening for Osteoporosis  -Send for DEXA scan, pending.   Patient verbalized understanding of the above. All questions were answered to patient's satisfaction. Dispo: Patient will follow up in 2 months.    ***Patient is cleared from Endocrine stand point for planned right total knee replacement on Jan 31st, 2024***

## 2024-10-03 LAB
25(OH)D3 SERPL-MCNC: 29 NG/ML
ALBUMIN SERPL ELPH-MCNC: 4.1 G/DL
ALP BLD-CCNC: 61 U/L
ALT SERPL-CCNC: 18 U/L
ANION GAP SERPL CALC-SCNC: 13 MMOL/L
AST SERPL-CCNC: 16 U/L
BILIRUB SERPL-MCNC: 0.6 MG/DL
BUN SERPL-MCNC: 17 MG/DL
CALCIUM SERPL-MCNC: 8.9 MG/DL
CHLORIDE SERPL-SCNC: 105 MMOL/L
CHOLEST SERPL-MCNC: 176 MG/DL
CO2 SERPL-SCNC: 22 MMOL/L
CREAT SERPL-MCNC: 0.82 MG/DL
CREAT SPEC-SCNC: 104 MG/DL
EGFR: 79 ML/MIN/1.73M2
ESTIMATED AVERAGE GLUCOSE: 131 MG/DL
GLUCOSE SERPL-MCNC: 125 MG/DL
HBA1C MFR BLD HPLC: 6.2 %
HDLC SERPL-MCNC: 67 MG/DL
LDLC SERPL CALC-MCNC: 97 MG/DL
MICROALBUMIN 24H UR DL<=1MG/L-MCNC: <1.2 MG/DL
MICROALBUMIN/CREAT 24H UR-RTO: NORMAL MG/G
NONHDLC SERPL-MCNC: 110 MG/DL
POTASSIUM SERPL-SCNC: 4.5 MMOL/L
PROT SERPL-MCNC: 6.9 G/DL
SODIUM SERPL-SCNC: 140 MMOL/L
TRIGL SERPL-MCNC: 68 MG/DL
VIT B12 SERPL-MCNC: 750 PG/ML

## 2024-10-08 ENCOUNTER — APPOINTMENT (OUTPATIENT)
Dept: PULMONOLOGY | Facility: CLINIC | Age: 69
End: 2024-10-08
Payer: MEDICAID

## 2024-10-08 VITALS — DIASTOLIC BLOOD PRESSURE: 67 MMHG | HEART RATE: 52 BPM | OXYGEN SATURATION: 97 % | SYSTOLIC BLOOD PRESSURE: 125 MMHG

## 2024-10-08 DIAGNOSIS — J45.909 UNSPECIFIED ASTHMA, UNCOMPLICATED: ICD-10-CM

## 2024-10-08 DIAGNOSIS — R06.09 OTHER FORMS OF DYSPNEA: ICD-10-CM

## 2024-10-08 PROCEDURE — 99214 OFFICE O/P EST MOD 30 MIN: CPT | Mod: 25

## 2024-10-08 PROCEDURE — 94010 BREATHING CAPACITY TEST: CPT

## 2024-10-08 PROCEDURE — ZZZZZ: CPT

## 2024-10-08 PROCEDURE — 94618 PULMONARY STRESS TESTING: CPT

## 2024-10-08 PROCEDURE — 94727 GAS DIL/WSHOT DETER LNG VOL: CPT

## 2024-10-08 PROCEDURE — 94729 DIFFUSING CAPACITY: CPT

## 2024-10-11 ENCOUNTER — APPOINTMENT (OUTPATIENT)
Dept: OPHTHALMOLOGY | Facility: CLINIC | Age: 69
End: 2024-10-11
Payer: MEDICAID

## 2024-10-11 ENCOUNTER — NON-APPOINTMENT (OUTPATIENT)
Age: 69
End: 2024-10-11

## 2024-10-11 PROCEDURE — 92012 INTRM OPH EXAM EST PATIENT: CPT

## 2024-10-21 ENCOUNTER — APPOINTMENT (OUTPATIENT)
Dept: INTERNAL MEDICINE | Facility: CLINIC | Age: 69
End: 2024-10-21
Payer: MEDICAID

## 2024-10-21 VITALS
OXYGEN SATURATION: 97 % | WEIGHT: 204 LBS | BODY MASS INDEX: 42.82 KG/M2 | TEMPERATURE: 97.5 F | DIASTOLIC BLOOD PRESSURE: 74 MMHG | HEART RATE: 62 BPM | HEIGHT: 58 IN | RESPIRATION RATE: 16 BRPM | SYSTOLIC BLOOD PRESSURE: 122 MMHG

## 2024-10-21 DIAGNOSIS — M15.9 POLYOSTEOARTHRITIS, UNSPECIFIED: ICD-10-CM

## 2024-10-21 DIAGNOSIS — E78.2 MIXED HYPERLIPIDEMIA: ICD-10-CM

## 2024-10-21 DIAGNOSIS — E11.9 TYPE 2 DIABETES MELLITUS W/OUT COMPLICATIONS: ICD-10-CM

## 2024-10-21 DIAGNOSIS — K21.9 GASTRO-ESOPHAGEAL REFLUX DISEASE W/OUT ESOPHAGITIS: ICD-10-CM

## 2024-10-21 PROCEDURE — 99214 OFFICE O/P EST MOD 30 MIN: CPT

## 2024-10-26 LAB
25(OH)D3 SERPL-MCNC: 29 NG/ML
ALBUMIN SERPL ELPH-MCNC: 4.2 G/DL
ALP BLD-CCNC: 52 U/L
ALT SERPL-CCNC: 7 U/L
ANION GAP SERPL CALC-SCNC: 13 MMOL/L
APPEARANCE: CLEAR
AST SERPL-CCNC: 18 U/L
BILIRUB SERPL-MCNC: 0.7 MG/DL
BILIRUBIN URINE: NEGATIVE
BLOOD URINE: NEGATIVE
BUN SERPL-MCNC: 15 MG/DL
CALCIUM SERPL-MCNC: 9.5 MG/DL
CHLORIDE SERPL-SCNC: 106 MMOL/L
CHOLEST SERPL-MCNC: 157 MG/DL
CO2 SERPL-SCNC: 22 MMOL/L
COLOR: YELLOW
CREAT SERPL-MCNC: 0.85 MG/DL
EGFR: 76 ML/MIN/1.73M2
ESTIMATED AVERAGE GLUCOSE: 126 MG/DL
FERRITIN SERPL-MCNC: 111 NG/ML
FOLATE SERPL-MCNC: 19.2 NG/ML
GLUCOSE QUALITATIVE U: >=1000 MG/DL
GLUCOSE SERPL-MCNC: 90 MG/DL
HBA1C MFR BLD HPLC: 6 %
HCT VFR BLD CALC: 35.2 %
HDLC SERPL-MCNC: 67 MG/DL
HGB BLD-MCNC: 11.2 G/DL
IRON SATN MFR SERPL: 18 %
IRON SERPL-MCNC: 53 UG/DL
KETONES URINE: NEGATIVE MG/DL
LDLC SERPL CALC-MCNC: 77 MG/DL
LEUKOCYTE ESTERASE URINE: NEGATIVE
MCHC RBC-ENTMCNC: 20.1 PG
MCHC RBC-ENTMCNC: 31.8 GM/DL
MCV RBC AUTO: 63.2 FL
NITRITE URINE: NEGATIVE
NONHDLC SERPL-MCNC: 90 MG/DL
PH URINE: 5.5
PLATELET # BLD AUTO: 262 K/UL
POTASSIUM SERPL-SCNC: 5.1 MMOL/L
PROT SERPL-MCNC: 7.3 G/DL
PROTEIN URINE: NORMAL MG/DL
RBC # BLD: 5.57 M/UL
RBC # FLD: 20.9 %
SODIUM SERPL-SCNC: 141 MMOL/L
SPECIFIC GRAVITY URINE: >1.03
TIBC SERPL-MCNC: 302 UG/DL
TRIGL SERPL-MCNC: 68 MG/DL
TSH SERPL-ACNC: 1.71 UIU/ML
UIBC SERPL-MCNC: 249 UG/DL
UROBILINOGEN URINE: 0.2 MG/DL
VIT B12 SERPL-MCNC: 712 PG/ML
WBC # FLD AUTO: 4.87 K/UL

## 2024-11-07 ENCOUNTER — APPOINTMENT (OUTPATIENT)
Dept: ORTHOPEDIC SURGERY | Facility: CLINIC | Age: 69
End: 2024-11-07

## 2024-11-08 ENCOUNTER — RESULT REVIEW (OUTPATIENT)
Age: 69
End: 2024-11-08

## 2024-11-08 ENCOUNTER — APPOINTMENT (OUTPATIENT)
Dept: MAMMOGRAPHY | Facility: CLINIC | Age: 69
End: 2024-11-08
Payer: MEDICAID

## 2024-11-08 ENCOUNTER — OUTPATIENT (OUTPATIENT)
Dept: OUTPATIENT SERVICES | Facility: HOSPITAL | Age: 69
LOS: 1 days | End: 2024-11-08
Payer: MEDICAID

## 2024-11-08 ENCOUNTER — APPOINTMENT (OUTPATIENT)
Dept: RADIOLOGY | Facility: CLINIC | Age: 69
End: 2024-11-08
Payer: MEDICAID

## 2024-11-08 DIAGNOSIS — Z00.8 ENCOUNTER FOR OTHER GENERAL EXAMINATION: ICD-10-CM

## 2024-11-08 DIAGNOSIS — E11.9 TYPE 2 DIABETES MELLITUS WITHOUT COMPLICATIONS: ICD-10-CM

## 2024-11-08 DIAGNOSIS — Z00.00 ENCOUNTER FOR GENERAL ADULT MEDICAL EXAMINATION WITHOUT ABNORMAL FINDINGS: ICD-10-CM

## 2024-11-08 DIAGNOSIS — Z90.721 ACQUIRED ABSENCE OF OVARIES, UNILATERAL: Chronic | ICD-10-CM

## 2024-11-08 PROCEDURE — 77085 DXA BONE DENSITY AXL VRT FX: CPT

## 2024-11-08 PROCEDURE — 77067 SCR MAMMO BI INCL CAD: CPT

## 2024-11-08 PROCEDURE — 77067 SCR MAMMO BI INCL CAD: CPT | Mod: 26

## 2024-11-08 PROCEDURE — 77085 DXA BONE DENSITY AXL VRT FX: CPT | Mod: 26

## 2024-11-08 PROCEDURE — 77063 BREAST TOMOSYNTHESIS BI: CPT | Mod: 26

## 2024-11-08 PROCEDURE — 77063 BREAST TOMOSYNTHESIS BI: CPT

## 2024-11-15 ENCOUNTER — APPOINTMENT (OUTPATIENT)
Dept: ORTHOPEDIC SURGERY | Facility: CLINIC | Age: 69
End: 2024-11-15
Payer: MEDICAID

## 2024-11-15 DIAGNOSIS — M17.0 BILATERAL PRIMARY OSTEOARTHRITIS OF KNEE: ICD-10-CM

## 2024-11-15 PROCEDURE — 73562 X-RAY EXAM OF KNEE 3: CPT | Mod: RT

## 2024-11-15 PROCEDURE — 73564 X-RAY EXAM KNEE 4 OR MORE: CPT | Mod: LT

## 2024-11-15 PROCEDURE — 99215 OFFICE O/P EST HI 40 MIN: CPT

## 2024-11-15 RX ORDER — CELECOXIB 100 MG/1
100 CAPSULE ORAL TWICE DAILY
Qty: 60 | Refills: 2 | Status: ACTIVE | COMMUNITY
Start: 2024-11-15 | End: 1900-01-01

## 2024-11-22 ENCOUNTER — APPOINTMENT (OUTPATIENT)
Dept: ENDOCRINOLOGY | Facility: CLINIC | Age: 69
End: 2024-11-22
Payer: MEDICAID

## 2024-11-22 VITALS
HEART RATE: 93 BPM | DIASTOLIC BLOOD PRESSURE: 79 MMHG | BODY MASS INDEX: 41.77 KG/M2 | WEIGHT: 199 LBS | OXYGEN SATURATION: 98 % | SYSTOLIC BLOOD PRESSURE: 130 MMHG | HEIGHT: 58 IN

## 2024-11-22 DIAGNOSIS — M85.851 OTHER SPECIFIED DISORDERS OF BONE DENSITY AND STRUCTURE, RIGHT THIGH: ICD-10-CM

## 2024-11-22 DIAGNOSIS — E11.9 TYPE 2 DIABETES MELLITUS W/OUT COMPLICATIONS: ICD-10-CM

## 2024-11-22 DIAGNOSIS — E03.9 HYPOTHYROIDISM, UNSPECIFIED: ICD-10-CM

## 2024-11-22 DIAGNOSIS — E78.2 MIXED HYPERLIPIDEMIA: ICD-10-CM

## 2024-11-22 DIAGNOSIS — M85.852 OTHER SPECIFIED DISORDERS OF BONE DENSITY AND STRUCTURE, RIGHT THIGH: ICD-10-CM

## 2024-11-22 DIAGNOSIS — M85.859 OTHER SPECIFIED DISORDERS OF BONE DENSITY AND STRUCTURE, UNSPECIFIED THIGH: ICD-10-CM

## 2024-11-22 PROCEDURE — 99214 OFFICE O/P EST MOD 30 MIN: CPT

## 2024-11-25 ENCOUNTER — APPOINTMENT (OUTPATIENT)
Dept: GASTROENTEROLOGY | Facility: CLINIC | Age: 69
End: 2024-11-25
Payer: MEDICAID

## 2024-11-25 VITALS
RESPIRATION RATE: 16 BRPM | HEIGHT: 58 IN | OXYGEN SATURATION: 97 % | BODY MASS INDEX: 41.77 KG/M2 | DIASTOLIC BLOOD PRESSURE: 83 MMHG | HEART RATE: 69 BPM | SYSTOLIC BLOOD PRESSURE: 125 MMHG | WEIGHT: 199 LBS | TEMPERATURE: 98.2 F

## 2024-11-25 DIAGNOSIS — E11.8 TYPE 2 DIABETES MELLITUS WITH UNSPECIFIED COMPLICATIONS: ICD-10-CM

## 2024-11-25 PROCEDURE — 99213 OFFICE O/P EST LOW 20 MIN: CPT

## 2024-11-25 RX ORDER — DICYCLOMINE HYDROCHLORIDE 10 MG/1
10 CAPSULE ORAL 3 TIMES DAILY
Qty: 30 | Refills: 0 | Status: ACTIVE | COMMUNITY
Start: 2024-11-25 | End: 1900-01-01

## 2024-11-25 RX ORDER — FAMOTIDINE 20 MG/1
20 TABLET, FILM COATED ORAL
Qty: 30 | Refills: 1 | Status: ACTIVE | COMMUNITY
Start: 2024-11-25 | End: 1900-01-01

## 2024-12-12 ENCOUNTER — RX RENEWAL (OUTPATIENT)
Age: 69
End: 2024-12-12

## 2024-12-27 ENCOUNTER — RX RENEWAL (OUTPATIENT)
Age: 69
End: 2024-12-27

## 2025-01-06 ENCOUNTER — RX RENEWAL (OUTPATIENT)
Age: 70
End: 2025-01-06

## 2025-01-28 ENCOUNTER — RX RENEWAL (OUTPATIENT)
Age: 70
End: 2025-01-28

## 2025-03-01 ENCOUNTER — RX RENEWAL (OUTPATIENT)
Age: 70
End: 2025-03-01

## 2025-03-03 ENCOUNTER — RX RENEWAL (OUTPATIENT)
Age: 70
End: 2025-03-03

## 2025-04-24 ENCOUNTER — RX RENEWAL (OUTPATIENT)
Age: 70
End: 2025-04-24

## 2025-04-29 ENCOUNTER — RX RENEWAL (OUTPATIENT)
Age: 70
End: 2025-04-29

## 2025-05-07 ENCOUNTER — NON-APPOINTMENT (OUTPATIENT)
Age: 70
End: 2025-05-07

## 2025-05-09 ENCOUNTER — NON-APPOINTMENT (OUTPATIENT)
Age: 70
End: 2025-05-09

## 2025-05-09 ENCOUNTER — APPOINTMENT (OUTPATIENT)
Dept: ORTHOPEDIC SURGERY | Facility: CLINIC | Age: 70
End: 2025-05-09
Payer: MEDICAID

## 2025-05-09 ENCOUNTER — APPOINTMENT (OUTPATIENT)
Dept: OPHTHALMOLOGY | Facility: CLINIC | Age: 70
End: 2025-05-09

## 2025-05-09 VITALS — BODY MASS INDEX: 40.73 KG/M2 | HEIGHT: 55 IN | WEIGHT: 176 LBS

## 2025-05-09 DIAGNOSIS — Z96.651 PRESENCE OF RIGHT ARTIFICIAL KNEE JOINT: ICD-10-CM

## 2025-05-09 PROCEDURE — 92133 CPTRZD OPH DX IMG PST SGM ON: CPT

## 2025-05-09 PROCEDURE — 73562 X-RAY EXAM OF KNEE 3: CPT | Mod: RT

## 2025-05-09 PROCEDURE — 99215 OFFICE O/P EST HI 40 MIN: CPT

## 2025-05-09 PROCEDURE — 73564 X-RAY EXAM KNEE 4 OR MORE: CPT | Mod: LT

## 2025-05-09 PROCEDURE — 92014 COMPRE OPH EXAM EST PT 1/>: CPT

## 2025-05-14 ENCOUNTER — OUTPATIENT (OUTPATIENT)
Dept: OUTPATIENT SERVICES | Facility: HOSPITAL | Age: 70
LOS: 1 days | End: 2025-05-14
Payer: MEDICAID

## 2025-05-14 ENCOUNTER — NON-APPOINTMENT (OUTPATIENT)
Age: 70
End: 2025-05-14

## 2025-05-14 ENCOUNTER — APPOINTMENT (OUTPATIENT)
Dept: CARDIOLOGY | Facility: CLINIC | Age: 70
End: 2025-05-14
Payer: MEDICAID

## 2025-05-14 VITALS
BODY MASS INDEX: 36.73 KG/M2 | HEIGHT: 58 IN | WEIGHT: 175 LBS | HEART RATE: 56 BPM | DIASTOLIC BLOOD PRESSURE: 78 MMHG | SYSTOLIC BLOOD PRESSURE: 118 MMHG

## 2025-05-14 VITALS
SYSTOLIC BLOOD PRESSURE: 125 MMHG | TEMPERATURE: 97 F | RESPIRATION RATE: 15 BRPM | WEIGHT: 182.98 LBS | HEART RATE: 66 BPM | OXYGEN SATURATION: 99 % | HEIGHT: 64 IN | DIASTOLIC BLOOD PRESSURE: 75 MMHG

## 2025-05-14 DIAGNOSIS — Z01.810 ENCOUNTER FOR PREPROCEDURAL CARDIOVASCULAR EXAMINATION: ICD-10-CM

## 2025-05-14 DIAGNOSIS — Z86.711 PERSONAL HISTORY OF PULMONARY EMBOLISM: ICD-10-CM

## 2025-05-14 DIAGNOSIS — I25.10 ATHEROSCLEROTIC HEART DISEASE OF NATIVE CORONARY ARTERY W/OUT ANGINA PECTORIS: ICD-10-CM

## 2025-05-14 DIAGNOSIS — Z01.818 ENCOUNTER FOR OTHER PREPROCEDURAL EXAMINATION: ICD-10-CM

## 2025-05-14 LAB
A1C WITH ESTIMATED AVERAGE GLUCOSE RESULT: 6.8 % — HIGH (ref 4–5.6)
ALBUMIN SERPL ELPH-MCNC: 3.6 G/DL — SIGNIFICANT CHANGE UP (ref 3.3–5)
ANION GAP SERPL CALC-SCNC: 7 MMOL/L — SIGNIFICANT CHANGE UP (ref 5–17)
ANISOCYTOSIS BLD QL: SLIGHT — SIGNIFICANT CHANGE UP
APTT BLD: 34.1 SEC — SIGNIFICANT CHANGE UP (ref 26.1–36.8)
BASOPHILS # BLD AUTO: 0.04 K/UL — SIGNIFICANT CHANGE UP (ref 0–0.2)
BASOPHILS NFR BLD AUTO: 0.7 % — SIGNIFICANT CHANGE UP (ref 0–2)
BUN SERPL-MCNC: 19 MG/DL — SIGNIFICANT CHANGE UP (ref 7–23)
CALCIUM SERPL-MCNC: 9.8 MG/DL — SIGNIFICANT CHANGE UP (ref 8.5–10.1)
CHLORIDE SERPL-SCNC: 105 MMOL/L — SIGNIFICANT CHANGE UP (ref 96–108)
CO2 SERPL-SCNC: 26 MMOL/L — SIGNIFICANT CHANGE UP (ref 22–31)
CREAT SERPL-MCNC: 0.92 MG/DL — SIGNIFICANT CHANGE UP (ref 0.5–1.3)
EGFR: 68 ML/MIN/1.73M2 — SIGNIFICANT CHANGE UP
EGFR: 68 ML/MIN/1.73M2 — SIGNIFICANT CHANGE UP
ELLIPTOCYTES BLD QL SMEAR: SLIGHT — SIGNIFICANT CHANGE UP
EOSINOPHIL # BLD AUTO: 0.13 K/UL — SIGNIFICANT CHANGE UP (ref 0–0.5)
EOSINOPHIL NFR BLD AUTO: 2.4 % — SIGNIFICANT CHANGE UP (ref 0–6)
ESTIMATED AVERAGE GLUCOSE: 148 MG/DL — HIGH (ref 68–114)
GLUCOSE SERPL-MCNC: 131 MG/DL — HIGH (ref 70–99)
HCT VFR BLD CALC: 37.8 % — SIGNIFICANT CHANGE UP (ref 34.5–45)
HCV AB S/CO SERPL IA: 0.17 S/CO — SIGNIFICANT CHANGE UP (ref 0–0.79)
HCV AB SERPL-IMP: SIGNIFICANT CHANGE UP
HGB BLD-MCNC: 11.7 G/DL — SIGNIFICANT CHANGE UP (ref 11.5–15.5)
HYPOCHROMIA BLD QL: SLIGHT — SIGNIFICANT CHANGE UP
IMM GRANULOCYTES # BLD AUTO: 0.04 K/UL — SIGNIFICANT CHANGE UP (ref 0–0.07)
IMM GRANULOCYTES NFR BLD AUTO: 0.7 % — SIGNIFICANT CHANGE UP (ref 0–0.9)
INR BLD: 0.97 RATIO — SIGNIFICANT CHANGE UP (ref 0.85–1.16)
LYMPHOCYTES # BLD AUTO: 1.67 K/UL — SIGNIFICANT CHANGE UP (ref 1–3.3)
LYMPHOCYTES NFR BLD AUTO: 31 % — SIGNIFICANT CHANGE UP (ref 13–44)
MANUAL SMEAR VERIFICATION: SIGNIFICANT CHANGE UP
MCHC RBC-ENTMCNC: 20 PG — LOW (ref 27–34)
MCHC RBC-ENTMCNC: 31 G/DL — LOW (ref 32–36)
MCV RBC AUTO: 64.5 FL — LOW (ref 80–100)
MICROCYTES BLD QL: ABNORMAL
MONOCYTES # BLD AUTO: 0.58 K/UL — SIGNIFICANT CHANGE UP (ref 0–0.9)
MONOCYTES NFR BLD AUTO: 10.8 % — SIGNIFICANT CHANGE UP (ref 2–14)
MRSA PCR RESULT.: DETECTED
NEUTROPHILS # BLD AUTO: 2.92 K/UL — SIGNIFICANT CHANGE UP (ref 1.8–7.4)
NEUTROPHILS NFR BLD AUTO: 54.4 % — SIGNIFICANT CHANGE UP (ref 43–77)
NRBC # BLD AUTO: 0 K/UL — SIGNIFICANT CHANGE UP (ref 0–0)
NRBC # FLD: 0 K/UL — SIGNIFICANT CHANGE UP (ref 0–0)
PLAT MORPH BLD: NORMAL — SIGNIFICANT CHANGE UP
PLATELET # BLD AUTO: 327 K/UL — SIGNIFICANT CHANGE UP (ref 150–400)
PMV BLD: SIGNIFICANT CHANGE UP FL (ref 7–13)
POIKILOCYTOSIS BLD QL AUTO: ABNORMAL
POTASSIUM SERPL-MCNC: 4.4 MMOL/L — SIGNIFICANT CHANGE UP (ref 3.5–5.3)
POTASSIUM SERPL-SCNC: 4.4 MMOL/L — SIGNIFICANT CHANGE UP (ref 3.5–5.3)
PROTHROM AB SERPL-ACNC: 11.4 SEC — SIGNIFICANT CHANGE UP (ref 9.9–13.4)
RBC # BLD: 5.86 M/UL — HIGH (ref 3.8–5.2)
RBC # FLD: 21.8 % — HIGH (ref 10.3–14.5)
RBC BLD AUTO: ABNORMAL
S AUREUS DNA NOSE QL NAA+PROBE: DETECTED
SCHISTOCYTES BLD QL AUTO: SLIGHT — SIGNIFICANT CHANGE UP
SODIUM SERPL-SCNC: 138 MMOL/L — SIGNIFICANT CHANGE UP (ref 135–145)
WBC # BLD: 5.38 K/UL — SIGNIFICANT CHANGE UP (ref 3.8–10.5)
WBC # FLD AUTO: 5.38 K/UL — SIGNIFICANT CHANGE UP (ref 3.8–10.5)
WBC MORPHOLOGY: NORMAL — SIGNIFICANT CHANGE UP

## 2025-05-14 PROCEDURE — G2211 COMPLEX E/M VISIT ADD ON: CPT | Mod: NC

## 2025-05-14 PROCEDURE — 87640 STAPH A DNA AMP PROBE: CPT

## 2025-05-14 PROCEDURE — 83036 HEMOGLOBIN GLYCOSYLATED A1C: CPT

## 2025-05-14 PROCEDURE — 82040 ASSAY OF SERUM ALBUMIN: CPT

## 2025-05-14 PROCEDURE — 93000 ELECTROCARDIOGRAM COMPLETE: CPT | Mod: NC

## 2025-05-14 PROCEDURE — 99214 OFFICE O/P EST MOD 30 MIN: CPT | Mod: 25

## 2025-05-14 PROCEDURE — 80048 BASIC METABOLIC PNL TOTAL CA: CPT

## 2025-05-14 PROCEDURE — 85025 COMPLETE CBC W/AUTO DIFF WBC: CPT

## 2025-05-14 PROCEDURE — 84443 ASSAY THYROID STIM HORMONE: CPT

## 2025-05-14 PROCEDURE — 85610 PROTHROMBIN TIME: CPT

## 2025-05-14 PROCEDURE — 99214 OFFICE O/P EST MOD 30 MIN: CPT

## 2025-05-14 PROCEDURE — 36415 COLL VENOUS BLD VENIPUNCTURE: CPT

## 2025-05-14 PROCEDURE — 85730 THROMBOPLASTIN TIME PARTIAL: CPT

## 2025-05-14 PROCEDURE — 87641 MR-STAPH DNA AMP PROBE: CPT

## 2025-05-14 PROCEDURE — 86803 HEPATITIS C AB TEST: CPT

## 2025-05-14 RX ORDER — METFORMIN HYDROCHLORIDE 850 MG/1
1 TABLET ORAL
Refills: 0 | DISCHARGE

## 2025-05-14 NOTE — H&P PST ADULT - GASTROINTESTINAL
MRI Contrast Discharge Instructions    The IV contrast you received today will pass out of your body in your  urine. This will happen in the next 24 hours. You will not feel this process.  Your urine will not change color.    Drink at least 4 extra glasses of water or juice today (unless your doctor  has restricted your fluids). This reduces the stress on your kidneys.  You may take your regular medicines.    If you are on dialysis: It is best to have dialysis today.    If you have a reaction: Most reactions happen right away. If you have  any new symptoms after leaving the hospital (such as hives or swelling),  call your hospital at the correct number below. Or call your family doctor.  If you have breathing distress or wheezing, call 911.    Special instructions: ***    I have read and understand the above information.    Signature:______________________________________ Date:___________    Staff:__________________________________________ Date:___________     Time:__________    [facility] Departments:    ___[place]: [phone]  ___[facility]: [phone]  ___[place]: [phone] ___[facility]: [phone]  ___[facility]: [phone]  ___[facility]: [phone]  ___[phone]  ___[facility]: [phone]  ___[phone]   details… soft/nondistended/normal active bowel sounds/no guarding

## 2025-05-14 NOTE — H&P PST ADULT - NSANTHOSAYNRD_GEN_A_CORE
patient and daughter denies h/o sleep apnea, neck circumference 14cm/No. LEMUEL screening performed.  STOP BANG Legend: 0-2 = LOW Risk; 3-4 = INTERMEDIATE Risk; 5-8 = HIGH Risk

## 2025-05-14 NOTE — H&P PST ADULT - HISTORY OF PRESENT ILLNESS
69 [place] Serbian speaking female with PMH HTN, HLD, hypothyroid, Type 2 Diabetes, GERD, IBS, glaucoma, PE after Covid vaccine. Patient with osteoarthritis of the left knee with pain worsening over the past 2-3months/years, despite conservative management including medication, physical therapy and injection therapy.  Patient underwent Right total knee replacement in 2024 and reports doing very well.  Pain is located in the left knee, 9/10 on pain scale, described as burning, aching, throbbing.  Pain is worsened by any activity and delfino with ambulation.  She uses a cane as needed.  Patient presents to PST for planned LEFT total knee replacement

## 2025-05-14 NOTE — H&P PST ADULT - ASSESSMENT
69 [place] Nepali speaking female presents to PST for planned LEFT total knee replacement      Plan:  - PST instructions given; NPO status/instructions to be given by ASU   - Pt instructed to take following meds on day of surgery: levothyroxine, eye drops.  Do not take metformin or torsemide morning of surgery.  Hold Jardiance for 3 days prior to surgery- last dose 25  - Pt instructed to take routine evening medications unless indicated   - Stop NSAIDS ( Aspirin Aleve Motrin Mobic Diclofenac), herbal supplements, MVI, Vitamin E, fish oil 7 days prior to surgery unless directed by surgeon or cardiologist;   - Medical Optimization with Dr Ceron.  Cardiac optimization with Dr. Huston.  Endocrine optimization with Dr. Du  - EZ wash instructions given & mupirocin instructions given  - CBC w/diff, BMP, HgbA1c, PT/INR, aPTT, albumin, MRSA/MSSA swab done today  - EKG on chart from Dr. Huston 25  - Joint education booklet given.  Patient attending Joint class.  PATIENT WILL REQUIRE A ROLLING WALKER AT HOME DUE TO THEIR DIAGNOSIS OF OSTEOARTHRITIS OF  KNEE TO HELP COMPLETE THEIR MRADLS     CAPRINI SCORE Version 2013    AGE RELATED RISK FACTORS                                                             [ ] Age 41-60 years             [1 point]  [ x] Age: 61-74 years            [2 points]                 [ ] Age 75 years or over     [3 points]             DISEASE RELATED RISK FACTORS                                                       [ ] Current swollen legs (pitting edema of any level)     [1 point]                     [ ] Varicose veins (visible, bulging vein, not spider veins or surgically removed veins)   [1 point]                                 [x ] BMI > 25 Kg/m2                       [1 point]  [ ] BMI > 40 kg/m2                       [1 point]                                 [ ] Serious infection (within past month, requiring hospitalization and IV antibiotics)    [1 point]                     [ ] Lung disease ( interstitial: COPD, emphysema, sarcoid, 9-11 illness, NOT asthma)       [1 point]                                                                          [ ] Acute myocardial infarction (within past month)      [1 point]                  [ ] Congestive heart failure (episode within past month or taking CHF meds)                   [1 point]         [ ] Inflammatory bowel disease (Crohns disease or ulcerative colitis, not irritable bowel syndrome)   [1 point]                  [ ] Central venous access, PICC line or Port (within past month)     [2 points]                                                             [ ] Stroke (within past month)     [5 points]    [ ] Previous or present malignancy (includes melanoma but not basal cell carcinoma, each incidence of cancer scores 2 points-not metastases)  [2 points]                                                                                                                                                         HEMATOLOGY RELATED FACTORS                                                         [ x] History of DVT or PE (including superficial venous thrombosis)    [3 points]                    [ ] Positive family history for DVT or PE (includes first-, second-, and third-degree relatives)   [3 points]   [ ] Personal or family history of genetic thrombophilia (family history counts only if it has not been confirmed that patient does not have this genetic marker)                       [ ] Prothrombin 04966S mutation                   [3 points]                           [ ] Factor V Leiden                                               [3 points]            [ ] Antithrombin III deficiency                           [3 points]         [ ] Protein C & S deficiency                                [3 points]              [ ] Dysfibrinogenemia                                         [3 points]  [ ] Personal history of acquired thrombophilia                       [ ] Lupus anticoagulant                                       [3 points]                                                                  [ ] Anticardiolipin antibodies                             [3 points]              [ ] Antiphospholipid antibodies                         [3 points]                                                         [ ] High homocysteine in the blood                  [3 points]                                                    [ ] Myeloproliferative disorders (including thrombocytosis)    [3 points]            [ ] HIV                                                                     [3 points]                                                    [ ] Heparin induced thrombocytopenia            [3 points]                                        MOBILITY RELATED FACTORS  [ ] Bed rest or restricted mobility (inability to ambulate 30 feet continuously or removable leg brace) for less than 72 hours    [1 point]  [ ] Nonremovable plaster cast or mold that prevents calf muscle use   [2 points]  [ ] Bed bound  or restricted mobility for more than 72 hours                  [2 points]    GENDER SPECIFIC FACTORS  [ ] Pregnancy or had a baby within the last month   [1 point]  [ ] Hormone therapy  or oral contraception               [1 point]  [ ] Current use of estrogen-like drugs (raloxifine, tamoxifen, anastrozole, letrozole)                                [1 point]  [ ] History of unexplained stillborn infant, premature birth with toxemia or growth-restricted infant   [1 point]  [ ] Recurrent spontaneous abortions (3 or more)      [1 point]    OTHER RISK FACTORS                                         [ ] Current smoker (includes vaping and smoking marijuana)      [1 point]  [ ] Diabetes requiring insulin     [1 point]                   [ ] Chemotherapy (includes methotrexate for rheumatoid arthritis, hydroxyurea for thrombocytosis)    [1 point]  [ ] Blood transfusion(s)               [1 point]    SURGERY RELATED RISK FACTORS  [ ]  section within the last month                    [1 point]  [ ] Minor surgery is planned (less than 45 minutes)     [1 point]  [ ] Past major surgery (longer than 45 minutes) within past month  [2 points]  [ ] Planned major surgery lasting more than 45 minutes (includes laparoscopic and arthroscopic; do not add to the "5" for hip and knee replacement)    [2 points]  [ ] Length of surgery over 2 hours (includes anesthesia time; do not add to the "5" for hip and knee replacement)   [1 point]  [x ] Elective hip or knee joint replacement surgery         [5 points]                                               TRAUMA RELATED RISK FACTORS  [ ] Fracture of the hip, pelvis, or leg                       [5 points]  [ ] Spinal cord injury resulting in paralysis (within the past month)    [5 points]  [ ] Paralysis  (within the past month)                      [5 points]  [ ] Multiple trauma (within the past month)         [5 Points]    Total Score [  11      ]    Total hip and total knee replacement:  Caprini score 9 or less: LOW risk  Caprini score 10 or highter: HIGH RISK    Caprini score 0-2: Low Risk, NO VTE prophylaxis required for most patients, encourage ambulation  Caprini score 3-6: Moderate Risk , pharmacologic VTE prophylaxis is indicated for most patients (in the absence of contraindications)  Caprini score 7 or higher: High risk, pharmocologic VTE prophylaxis indicated for most patients (in the absence of contraindications)

## 2025-05-14 NOTE — H&P PST ADULT - NSICDXFAMILYHX_GEN_ALL_CORE_FT
FAMILY HISTORY:  Father  Still living? Unknown  FH: heart disease, Age at diagnosis: Age Unknown    Mother  Still living? No  FH: heart disease, Age at diagnosis: Age Unknown    Sibling  Still living? Yes, Estimated age: Age Unknown  Family history of breast cancer, Age at diagnosis: Age Unknown    Grandparent  Still living? Unknown  FH: heart disease, Age at diagnosis: Age Unknown

## 2025-05-14 NOTE — H&P PST ADULT - NS PRO AD PATIENT TYPE
[id number]] Collected: 05/10/22 142   Order Status: Completed Specimen: Blood, PICC Updated: 22    Culture, Blood or Bone Marrow No Growth 2 Days. TRANSTHORACIC ECHO (TTE) LIMITED W/ W/O IMAGING AGENT  *[address]  [address] [facility], [address] [place] [address]  [phone]  Limited Transthoracic Echocardiogram (TTE)    Patient: Deo Owen Date/Time:      May 11 2022 12:14PM  MRN:     [id number]          FIN#:                 [id number]  :     1956       Ht/Wt:                180cm 90.3kg  Age:     61               BSA/BMI:              2.1m^2 27.9kg/m^2  Gender:  M                Baseline BP:          135 / 61  Ordering Physician:   Margie Cary     Referring Physician:  Sudheer Landin     Attending Physician:  Radhika Winters     Diagnostic Physician: Jose Alejandro Perez MD  Sonographer:          Jazlyn Hughes     --------------------------------------------------------------------------  CHANA/CHANA/[employer]. Thank you.       Jairo Feliz MD  5/12/2022   5:07 PM Health Care Proxy (HCP)

## 2025-05-14 NOTE — H&P PST ADULT - NSICDXPASTMEDICALHX_GEN_ALL_CORE_FT
PAST MEDICAL HISTORY:  Arthritis     Glaucoma     Hyperlipidemia     Hypothyroid     Left knee pain     Lower extremity edema     Osteoarthritis     Pulmonary embolism     Seasonal asthma     Stomach ulcer     Type II diabetes mellitus

## 2025-05-15 ENCOUNTER — APPOINTMENT (OUTPATIENT)
Dept: GASTROENTEROLOGY | Facility: CLINIC | Age: 70
End: 2025-05-15
Payer: MEDICAID

## 2025-05-15 ENCOUNTER — APPOINTMENT (OUTPATIENT)
Dept: INTERNAL MEDICINE | Facility: CLINIC | Age: 70
End: 2025-05-15
Payer: SELF-PAY

## 2025-05-15 VITALS
DIASTOLIC BLOOD PRESSURE: 60 MMHG | SYSTOLIC BLOOD PRESSURE: 118 MMHG | HEART RATE: 79 BPM | HEIGHT: 56 IN | OXYGEN SATURATION: 98 % | BODY MASS INDEX: 40.94 KG/M2 | WEIGHT: 182 LBS | TEMPERATURE: 97.3 F

## 2025-05-15 VITALS
OXYGEN SATURATION: 97 % | TEMPERATURE: 97.8 F | WEIGHT: 187 LBS | DIASTOLIC BLOOD PRESSURE: 64 MMHG | HEART RATE: 71 BPM | BODY MASS INDEX: 42.07 KG/M2 | RESPIRATION RATE: 16 BRPM | SYSTOLIC BLOOD PRESSURE: 106 MMHG | HEIGHT: 56 IN

## 2025-05-15 DIAGNOSIS — M17.0 BILATERAL PRIMARY OSTEOARTHRITIS OF KNEE: ICD-10-CM

## 2025-05-15 DIAGNOSIS — R10.13 EPIGASTRIC PAIN: ICD-10-CM

## 2025-05-15 DIAGNOSIS — Z01.818 ENCOUNTER FOR OTHER PREPROCEDURAL EXAMINATION: ICD-10-CM

## 2025-05-15 DIAGNOSIS — R10.30 LOWER ABDOMINAL PAIN, UNSPECIFIED: ICD-10-CM

## 2025-05-15 DIAGNOSIS — K80.20 CALCULUS OF GALLBLADDER W/OUT CHOLECYSTITIS W/OUT OBSTRUCTION: ICD-10-CM

## 2025-05-15 DIAGNOSIS — K21.9 GASTRO-ESOPHAGEAL REFLUX DISEASE W/OUT ESOPHAGITIS: ICD-10-CM

## 2025-05-15 PROBLEM — G47.30 SLEEP APNEA, UNSPECIFIED: Chronic | Status: INACTIVE | Noted: 2024-01-23 | Resolved: 2025-05-14

## 2025-05-15 PROCEDURE — 99214 OFFICE O/P EST MOD 30 MIN: CPT

## 2025-05-15 PROCEDURE — G2211 COMPLEX E/M VISIT ADD ON: CPT

## 2025-05-15 RX ORDER — DESIPRAMINE 10 MG/1
10 TABLET, FILM COATED ORAL
Qty: 60 | Refills: 5 | Status: ACTIVE | COMMUNITY
Start: 2025-05-15 | End: 1900-01-01

## 2025-05-15 NOTE — CHART NOTE - NSCHARTNOTEFT_GEN_A_CORE
5/15/2025- +staph screen, patient called and result reviewed with patient's daughter Naomi Downing, patient does not speak English.  Instructed to start Mupirocin ointment on Friday 5/23/2025  , and use as directed, with last dose on Tuesday 5/27/2025 . Patient's daughter verbalized understanding. 5/15/2025- +staph screen, patient called and result reviewed with patient's daughter Naomi Downing, patient does not speak English.  Instructed to start Mupirocin ointment on Friday 5/23/2025  , and use as directed, with last dose on Tuesday 5/27/2025 . Patient's daughter verbalized understanding. Spoke with [place] surgical coordinator for Dr Singh . She will notify surgeon that patient +MSSA/+MRSA and need to change preop antibiotic to Vancomycin 5/15/2025- +staph screen, patient called and result reviewed with patient's daughter Naomi Downing, patient does not speak English.  Instructed to start Mupirocin ointment on Friday 5/23/2025  , and use as directed, with last dose on Tuesday 5/27/2025 . Patient's daughter verbalized understanding. Spoke with [place] surgical coordinator for Dr Singh . She will notify surgeon that patient +MSSA/+MRSA and need to add Vancomycin as preop antibiotic with Ancef

## 2025-05-16 ENCOUNTER — APPOINTMENT (OUTPATIENT)
Dept: ENDOCRINOLOGY | Facility: CLINIC | Age: 70
End: 2025-05-16
Payer: MEDICAID

## 2025-05-16 DIAGNOSIS — E66.9 OBESITY, UNSPECIFIED: ICD-10-CM

## 2025-05-16 DIAGNOSIS — E11.9 TYPE 2 DIABETES MELLITUS W/OUT COMPLICATIONS: ICD-10-CM

## 2025-05-16 DIAGNOSIS — E78.2 MIXED HYPERLIPIDEMIA: ICD-10-CM

## 2025-05-16 DIAGNOSIS — E03.9 HYPOTHYROIDISM, UNSPECIFIED: ICD-10-CM

## 2025-05-16 LAB — T4 FREE+ TSH PNL SERPL: 2.47 UIU/ML — SIGNIFICANT CHANGE UP (ref 0.27–4.2)

## 2025-05-16 PROCEDURE — 99214 OFFICE O/P EST MOD 30 MIN: CPT | Mod: 95

## 2025-05-17 PROBLEM — M25.562 PAIN IN LEFT KNEE: Chronic | Status: ACTIVE | Noted: 2025-05-14

## 2025-05-17 PROBLEM — M19.90 UNSPECIFIED OSTEOARTHRITIS, UNSPECIFIED SITE: Chronic | Status: ACTIVE | Noted: 2025-05-14

## 2025-05-21 ENCOUNTER — APPOINTMENT (OUTPATIENT)
Dept: ULTRASOUND IMAGING | Facility: CLINIC | Age: 70
End: 2025-05-21
Payer: MEDICAID

## 2025-05-21 ENCOUNTER — OUTPATIENT (OUTPATIENT)
Dept: OUTPATIENT SERVICES | Facility: HOSPITAL | Age: 70
LOS: 1 days | End: 2025-05-21
Payer: MEDICAID

## 2025-05-21 DIAGNOSIS — R10.13 EPIGASTRIC PAIN: ICD-10-CM

## 2025-05-21 DIAGNOSIS — Z00.8 ENCOUNTER FOR OTHER GENERAL EXAMINATION: ICD-10-CM

## 2025-05-21 DIAGNOSIS — K80.20 CALCULUS OF GALLBLADDER WITHOUT CHOLECYSTITIS WITHOUT OBSTRUCTION: ICD-10-CM

## 2025-05-21 PROCEDURE — 76700 US EXAM ABDOM COMPLETE: CPT

## 2025-05-21 PROCEDURE — 76700 US EXAM ABDOM COMPLETE: CPT | Mod: 26

## 2025-05-23 RX ORDER — OXYCODONE HYDROCHLORIDE 30 MG/1
5 TABLET ORAL
Refills: 0 | Status: DISCONTINUED | OUTPATIENT
Start: 2025-05-28 | End: 2025-05-29

## 2025-05-23 RX ORDER — ACETAMINOPHEN 500 MG/5ML
1000 LIQUID (ML) ORAL EVERY 8 HOURS
Refills: 0 | Status: DISCONTINUED | OUTPATIENT
Start: 2025-05-28 | End: 2025-05-29

## 2025-05-23 RX ORDER — SODIUM CHLORIDE 9 G/1000ML
1000 INJECTION, SOLUTION INTRAVENOUS
Refills: 0 | Status: DISCONTINUED | OUTPATIENT
Start: 2025-05-29 | End: 2025-05-29

## 2025-05-23 RX ORDER — TRANEXAMIC ACID 1000 MG/10
1 AMPUL (ML) INTRAVENOUS ONCE
Refills: 0 | Status: DISCONTINUED | OUTPATIENT
Start: 2025-05-28 | End: 2025-05-29

## 2025-05-23 RX ORDER — MAGNESIUM HYDROXIDE 400 MG/5ML
30 SUSPENSION ORAL DAILY
Refills: 0 | Status: DISCONTINUED | OUTPATIENT
Start: 2025-05-28 | End: 2025-05-29

## 2025-05-23 RX ORDER — HYDROMORPHONE/SOD CHLOR,ISO/PF 2 MG/10 ML
0.5 SYRINGE (ML) INJECTION
Refills: 0 | Status: DISCONTINUED | OUTPATIENT
Start: 2025-05-28 | End: 2025-05-29

## 2025-05-23 RX ORDER — ACETAMINOPHEN 500 MG/5ML
1000 LIQUID (ML) ORAL EVERY 8 HOURS
Refills: 0 | Status: DISCONTINUED | OUTPATIENT
Start: 2025-05-29 | End: 2025-05-29

## 2025-05-23 RX ORDER — ONDANSETRON HCL/PF 4 MG/2 ML
4 VIAL (ML) INJECTION EVERY 6 HOURS
Refills: 0 | Status: DISCONTINUED | OUTPATIENT
Start: 2025-05-28 | End: 2025-05-29

## 2025-05-23 RX ORDER — OXYCODONE HYDROCHLORIDE 30 MG/1
10 TABLET ORAL
Refills: 0 | Status: DISCONTINUED | OUTPATIENT
Start: 2025-05-28 | End: 2025-05-29

## 2025-05-23 RX ORDER — POLYETHYLENE GLYCOL 3350 17 G/17G
17 POWDER, FOR SOLUTION ORAL AT BEDTIME
Refills: 0 | Status: DISCONTINUED | OUTPATIENT
Start: 2025-05-28 | End: 2025-05-29

## 2025-05-23 RX ORDER — CELECOXIB 50 MG/1
200 CAPSULE ORAL EVERY 12 HOURS
Refills: 0 | Status: DISCONTINUED | OUTPATIENT
Start: 2025-05-29 | End: 2025-05-29

## 2025-05-23 RX ORDER — SENNA 187 MG
2 TABLET ORAL AT BEDTIME
Refills: 0 | Status: DISCONTINUED | OUTPATIENT
Start: 2025-05-28 | End: 2025-05-29

## 2025-05-23 RX ORDER — RIVAROXABAN 10 MG/1
10 TABLET, FILM COATED ORAL DAILY
Refills: 0 | Status: DISCONTINUED | OUTPATIENT
Start: 2025-05-29 | End: 2025-05-29

## 2025-05-23 RX ORDER — SULFAMETHOXAZOLE/TRIMETHOPRIM 800-160 MG
1 TABLET ORAL
Refills: 0 | Status: DISCONTINUED | OUTPATIENT
Start: 2025-05-29 | End: 2025-05-29

## 2025-05-28 ENCOUNTER — APPOINTMENT (OUTPATIENT)
Dept: ORTHOPEDIC SURGERY | Facility: HOSPITAL | Age: 70
End: 2025-05-28

## 2025-05-28 ENCOUNTER — TRANSCRIPTION ENCOUNTER (OUTPATIENT)
Age: 70
End: 2025-05-28

## 2025-05-28 ENCOUNTER — INPATIENT (INPATIENT)
Facility: HOSPITAL | Age: 70
LOS: 0 days | Discharge: HOME HEALTH | DRG: 351 | End: 2025-05-29
Attending: STUDENT IN AN ORGANIZED HEALTH CARE EDUCATION/TRAINING PROGRAM | Admitting: STUDENT IN AN ORGANIZED HEALTH CARE EDUCATION/TRAINING PROGRAM
Payer: MEDICAID

## 2025-05-28 ENCOUNTER — RESULT REVIEW (OUTPATIENT)
Age: 70
End: 2025-05-28

## 2025-05-28 VITALS
OXYGEN SATURATION: 100 % | WEIGHT: 182.98 LBS | TEMPERATURE: 98 F | HEART RATE: 60 BPM | HEIGHT: 56 IN | SYSTOLIC BLOOD PRESSURE: 111 MMHG | RESPIRATION RATE: 16 BRPM | DIASTOLIC BLOOD PRESSURE: 69 MMHG

## 2025-05-28 DIAGNOSIS — M17.12 UNILATERAL PRIMARY OSTEOARTHRITIS, LEFT KNEE: ICD-10-CM

## 2025-05-28 LAB
ANION GAP SERPL CALC-SCNC: 5 MMOL/L — SIGNIFICANT CHANGE UP (ref 5–17)
BUN SERPL-MCNC: 18 MG/DL — SIGNIFICANT CHANGE UP (ref 7–23)
CALCIUM SERPL-MCNC: 9.1 MG/DL — SIGNIFICANT CHANGE UP (ref 8.5–10.1)
CHLORIDE SERPL-SCNC: 106 MMOL/L — SIGNIFICANT CHANGE UP (ref 96–108)
CO2 SERPL-SCNC: 23 MMOL/L — SIGNIFICANT CHANGE UP (ref 22–31)
CREAT SERPL-MCNC: 0.78 MG/DL — SIGNIFICANT CHANGE UP (ref 0.5–1.3)
EGFR: 83 ML/MIN/1.73M2 — SIGNIFICANT CHANGE UP
EGFR: 83 ML/MIN/1.73M2 — SIGNIFICANT CHANGE UP
GLUCOSE BLDC GLUCOMTR-MCNC: 130 MG/DL — HIGH (ref 70–99)
GLUCOSE BLDC GLUCOMTR-MCNC: 153 MG/DL — HIGH (ref 70–99)
GLUCOSE BLDC GLUCOMTR-MCNC: 188 MG/DL — HIGH (ref 70–99)
GLUCOSE BLDC GLUCOMTR-MCNC: 241 MG/DL — HIGH (ref 70–99)
GLUCOSE SERPL-MCNC: 166 MG/DL — HIGH (ref 70–99)
HCT VFR BLD CALC: 35.9 % — SIGNIFICANT CHANGE UP (ref 34.5–45)
HGB BLD-MCNC: 11.3 G/DL — LOW (ref 11.5–15.5)
MCHC RBC-ENTMCNC: 20 PG — LOW (ref 27–34)
MCHC RBC-ENTMCNC: 31.5 G/DL — LOW (ref 32–36)
MCV RBC AUTO: 63.7 FL — LOW (ref 80–100)
NRBC # BLD AUTO: 0 K/UL — SIGNIFICANT CHANGE UP (ref 0–0)
NRBC # FLD: 0 K/UL — SIGNIFICANT CHANGE UP (ref 0–0)
PLATELET # BLD AUTO: 250 K/UL — SIGNIFICANT CHANGE UP (ref 150–400)
PMV BLD: SIGNIFICANT CHANGE UP FL (ref 7–13)
POTASSIUM SERPL-MCNC: 4 MMOL/L — SIGNIFICANT CHANGE UP (ref 3.5–5.3)
POTASSIUM SERPL-SCNC: 4 MMOL/L — SIGNIFICANT CHANGE UP (ref 3.5–5.3)
RBC # BLD: 5.64 M/UL — HIGH (ref 3.8–5.2)
RBC # FLD: 21.1 % — HIGH (ref 10.3–14.5)
SODIUM SERPL-SCNC: 134 MMOL/L — LOW (ref 135–145)
WBC # BLD: 9.1 K/UL — SIGNIFICANT CHANGE UP (ref 3.8–10.5)
WBC # FLD AUTO: 9.1 K/UL — SIGNIFICANT CHANGE UP (ref 3.8–10.5)

## 2025-05-28 PROCEDURE — 99024 POSTOP FOLLOW-UP VISIT: CPT

## 2025-05-28 PROCEDURE — 97162 PT EVAL MOD COMPLEX 30 MIN: CPT | Mod: GP

## 2025-05-28 PROCEDURE — 97116 GAIT TRAINING THERAPY: CPT | Mod: GP

## 2025-05-28 PROCEDURE — 97607 NEG PRS WND THR NDME<=50SQCM: CPT

## 2025-05-28 PROCEDURE — 80048 BASIC METABOLIC PNL TOTAL CA: CPT

## 2025-05-28 PROCEDURE — 97530 THERAPEUTIC ACTIVITIES: CPT | Mod: GP

## 2025-05-28 PROCEDURE — 27447 TOTAL KNEE ARTHROPLASTY: CPT | Mod: LT

## 2025-05-28 PROCEDURE — 73560 X-RAY EXAM OF KNEE 1 OR 2: CPT | Mod: 26,LT

## 2025-05-28 PROCEDURE — 82962 GLUCOSE BLOOD TEST: CPT

## 2025-05-28 PROCEDURE — 99222 1ST HOSP IP/OBS MODERATE 55: CPT

## 2025-05-28 PROCEDURE — 85027 COMPLETE CBC AUTOMATED: CPT

## 2025-05-28 PROCEDURE — 88300 SURGICAL PATH GROSS: CPT | Mod: 26

## 2025-05-28 PROCEDURE — 20985 CPTR-ASST DIR MS PX: CPT

## 2025-05-28 PROCEDURE — 73560 X-RAY EXAM OF KNEE 1 OR 2: CPT | Mod: LT

## 2025-05-28 PROCEDURE — 36415 COLL VENOUS BLD VENIPUNCTURE: CPT

## 2025-05-28 PROCEDURE — C1776: CPT

## 2025-05-28 PROCEDURE — C1713: CPT

## 2025-05-28 PROCEDURE — 88300 SURGICAL PATH GROSS: CPT

## 2025-05-28 RX ORDER — DEXTROSE 50 % IN WATER 50 %
25 SYRINGE (ML) INTRAVENOUS ONCE
Refills: 0 | Status: DISCONTINUED | OUTPATIENT
Start: 2025-05-28 | End: 2025-05-29

## 2025-05-28 RX ORDER — DEXAMETHASONE 0.5 MG/1
8 TABLET ORAL ONCE
Refills: 0 | Status: COMPLETED | OUTPATIENT
Start: 2025-05-29 | End: 2025-05-29

## 2025-05-28 RX ORDER — VANCOMYCIN HCL IN 5 % DEXTROSE 1.5G/250ML
1250 PLASTIC BAG, INJECTION (ML) INTRAVENOUS ONCE
Refills: 0 | Status: COMPLETED | OUTPATIENT
Start: 2025-05-28 | End: 2025-05-28

## 2025-05-28 RX ORDER — CEFAZOLIN SODIUM IN 0.9 % NACL 3 G/100 ML
2000 INTRAVENOUS SOLUTION, PIGGYBACK (ML) INTRAVENOUS EVERY 8 HOURS
Refills: 0 | Status: COMPLETED | OUTPATIENT
Start: 2025-05-28 | End: 2025-05-29

## 2025-05-28 RX ORDER — APREPITANT 40 MG/1
40 CAPSULE ORAL ONCE
Refills: 0 | Status: COMPLETED | OUTPATIENT
Start: 2025-05-28 | End: 2025-05-28

## 2025-05-28 RX ORDER — ATORVASTATIN CALCIUM 80 MG/1
20 TABLET, FILM COATED ORAL AT BEDTIME
Refills: 0 | Status: DISCONTINUED | OUTPATIENT
Start: 2025-05-28 | End: 2025-05-29

## 2025-05-28 RX ORDER — FENTANYL CITRATE-0.9 % NACL/PF 100MCG/2ML
50 SYRINGE (ML) INTRAVENOUS
Refills: 0 | Status: DISCONTINUED | OUTPATIENT
Start: 2025-05-28 | End: 2025-05-28

## 2025-05-28 RX ORDER — RIVAROXABAN 10 MG/1
1 TABLET, FILM COATED ORAL
Qty: 14 | Refills: 0
Start: 2025-05-28 | End: 2025-06-10

## 2025-05-28 RX ORDER — SODIUM CHLORIDE 9 G/1000ML
500 INJECTION, SOLUTION INTRAVENOUS ONCE
Refills: 0 | Status: COMPLETED | OUTPATIENT
Start: 2025-05-28 | End: 2025-05-28

## 2025-05-28 RX ORDER — OXYCODONE HYDROCHLORIDE 30 MG/1
5 TABLET ORAL ONCE
Refills: 0 | Status: DISCONTINUED | OUTPATIENT
Start: 2025-05-28 | End: 2025-05-28

## 2025-05-28 RX ORDER — EMPAGLIFLOZIN 25 MG/1
1 TABLET, FILM COATED ORAL
Refills: 0 | DISCHARGE

## 2025-05-28 RX ORDER — SODIUM CHLORIDE 9 G/1000ML
1000 INJECTION, SOLUTION INTRAVENOUS
Refills: 0 | Status: DISCONTINUED | OUTPATIENT
Start: 2025-05-28 | End: 2025-05-29

## 2025-05-28 RX ORDER — LANOLIN/MINERAL OIL/PETROLATUM
1 OINTMENT (GRAM) OPHTHALMIC (EYE)
Refills: 0 | Status: DISCONTINUED | OUTPATIENT
Start: 2025-05-28 | End: 2025-05-29

## 2025-05-28 RX ORDER — HYDROMORPHONE/SOD CHLOR,ISO/PF 2 MG/10 ML
0.5 SYRINGE (ML) INJECTION
Refills: 0 | Status: DISCONTINUED | OUTPATIENT
Start: 2025-05-28 | End: 2025-05-28

## 2025-05-28 RX ORDER — ASPIRIN 325 MG
1 TABLET ORAL
Qty: 28 | Refills: 0
Start: 2025-05-28 | End: 2025-06-10

## 2025-05-28 RX ORDER — POLYETHYLENE GLYCOL 3350 17 G/17G
17 POWDER, FOR SOLUTION ORAL
Qty: 119 | Refills: 0
Start: 2025-05-28 | End: 2025-06-03

## 2025-05-28 RX ORDER — ONDANSETRON HCL/PF 4 MG/2 ML
1 VIAL (ML) INJECTION
Qty: 9 | Refills: 0
Start: 2025-05-28 | End: 2025-05-30

## 2025-05-28 RX ORDER — SULFAMETHOXAZOLE/TRIMETHOPRIM 800-160 MG
1 TABLET ORAL
Qty: 14 | Refills: 0
Start: 2025-05-28 | End: 2025-06-03

## 2025-05-28 RX ORDER — CELECOXIB 50 MG/1
1 CAPSULE ORAL
Qty: 60 | Refills: 0
Start: 2025-05-28 | End: 2025-06-26

## 2025-05-28 RX ORDER — DORZOLAMIDE HYDROCHLORIDE AND TIMOLOL MALEATE 20; 5 MG/ML; MG/ML
1 SOLUTION/ DROPS OPHTHALMIC ONCE
Refills: 0 | Status: DISCONTINUED | OUTPATIENT
Start: 2025-05-28 | End: 2025-05-29

## 2025-05-28 RX ORDER — INSULIN LISPRO 100 U/ML
INJECTION, SOLUTION INTRAVENOUS; SUBCUTANEOUS AT BEDTIME
Refills: 0 | Status: DISCONTINUED | OUTPATIENT
Start: 2025-05-28 | End: 2025-05-29

## 2025-05-28 RX ORDER — SULFAMETHOXAZOLE/TRIMETHOPRIM 800-160 MG
1 TABLET ORAL
Qty: 14 | Refills: 0
Start: 2025-05-28 | End: 2025-06-04

## 2025-05-28 RX ORDER — INSULIN LISPRO 100 U/ML
INJECTION, SOLUTION INTRAVENOUS; SUBCUTANEOUS
Refills: 0 | Status: DISCONTINUED | OUTPATIENT
Start: 2025-05-28 | End: 2025-05-29

## 2025-05-28 RX ORDER — BRIMONIDINE TARTRATE 1.5 MG/ML
1 SOLUTION/ DROPS OPHTHALMIC
Refills: 0 | Status: DISCONTINUED | OUTPATIENT
Start: 2025-05-28 | End: 2025-05-29

## 2025-05-28 RX ORDER — CEFAZOLIN SODIUM IN 0.9 % NACL 3 G/100 ML
2000 INTRAVENOUS SOLUTION, PIGGYBACK (ML) INTRAVENOUS EVERY 8 HOURS
Refills: 0 | Status: DISCONTINUED | OUTPATIENT
Start: 2025-05-28 | End: 2025-05-28

## 2025-05-28 RX ORDER — DEXTROSE 50 % IN WATER 50 %
15 SYRINGE (ML) INTRAVENOUS ONCE
Refills: 0 | Status: DISCONTINUED | OUTPATIENT
Start: 2025-05-28 | End: 2025-05-29

## 2025-05-28 RX ORDER — CEFADROXIL 500 MG/1
500 CAPSULE ORAL
Refills: 0 | Status: DISCONTINUED | OUTPATIENT
Start: 2025-05-29 | End: 2025-05-29

## 2025-05-28 RX ORDER — ACETAMINOPHEN 500 MG/5ML
2 LIQUID (ML) ORAL
Qty: 84 | Refills: 0
Start: 2025-05-28 | End: 2025-06-10

## 2025-05-28 RX ORDER — DEXTROSE 50 % IN WATER 50 %
12.5 SYRINGE (ML) INTRAVENOUS ONCE
Refills: 0 | Status: DISCONTINUED | OUTPATIENT
Start: 2025-05-28 | End: 2025-05-29

## 2025-05-28 RX ORDER — OXYCODONE HYDROCHLORIDE 30 MG/1
1 TABLET ORAL
Qty: 20 | Refills: 0
Start: 2025-05-28 | End: 2025-06-01

## 2025-05-28 RX ORDER — AMITRIPTYLINE HYDROCHLORIDE 25 MG/1
20 TABLET, FILM COATED ORAL
Refills: 0 | DISCHARGE

## 2025-05-28 RX ORDER — SENNA 187 MG
2 TABLET ORAL
Qty: 14 | Refills: 0
Start: 2025-05-28 | End: 2025-06-03

## 2025-05-28 RX ORDER — ONDANSETRON HCL/PF 4 MG/2 ML
4 VIAL (ML) INJECTION ONCE
Refills: 0 | Status: DISCONTINUED | OUTPATIENT
Start: 2025-05-28 | End: 2025-05-28

## 2025-05-28 RX ORDER — GLUCAGON 3 MG/1
1 POWDER NASAL ONCE
Refills: 0 | Status: DISCONTINUED | OUTPATIENT
Start: 2025-05-28 | End: 2025-05-29

## 2025-05-28 RX ORDER — LEVOTHYROXINE SODIUM 300 MCG
50 TABLET ORAL DAILY
Refills: 0 | Status: DISCONTINUED | OUTPATIENT
Start: 2025-05-28 | End: 2025-05-29

## 2025-05-28 RX ORDER — AMITRIPTYLINE HYDROCHLORIDE 25 MG/1
10 TABLET, FILM COATED ORAL AT BEDTIME
Refills: 0 | Status: DISCONTINUED | OUTPATIENT
Start: 2025-05-28 | End: 2025-05-29

## 2025-05-28 RX ORDER — CEFADROXIL 500 MG/1
1 CAPSULE ORAL
Qty: 14 | Refills: 0
Start: 2025-05-28 | End: 2025-06-03

## 2025-05-28 RX ADMIN — Medication 0.5 MILLIGRAM(S): at 16:00

## 2025-05-28 RX ADMIN — SODIUM CHLORIDE 500 MILLILITER(S): 9 INJECTION, SOLUTION INTRAVENOUS at 14:15

## 2025-05-28 RX ADMIN — APREPITANT 40 MILLIGRAM(S): 40 CAPSULE ORAL at 10:20

## 2025-05-28 RX ADMIN — SODIUM CHLORIDE 500 MILLILITER(S): 9 INJECTION, SOLUTION INTRAVENOUS at 17:11

## 2025-05-28 RX ADMIN — ATORVASTATIN CALCIUM 20 MILLIGRAM(S): 80 TABLET, FILM COATED ORAL at 21:29

## 2025-05-28 RX ADMIN — OXYCODONE HYDROCHLORIDE 5 MILLIGRAM(S): 30 TABLET ORAL at 15:13

## 2025-05-28 RX ADMIN — Medication 0.5 MILLIGRAM(S): at 15:02

## 2025-05-28 RX ADMIN — INSULIN LISPRO 1: 100 INJECTION, SOLUTION INTRAVENOUS; SUBCUTANEOUS at 17:12

## 2025-05-28 RX ADMIN — Medication 166.67 MILLIGRAM(S): at 10:42

## 2025-05-28 RX ADMIN — AMITRIPTYLINE HYDROCHLORIDE 10 MILLIGRAM(S): 25 TABLET, FILM COATED ORAL at 21:30

## 2025-05-28 RX ADMIN — BRIMONIDINE TARTRATE 1 DROP(S): 1.5 SOLUTION/ DROPS OPHTHALMIC at 21:33

## 2025-05-28 RX ADMIN — Medication 2 TABLET(S): at 21:29

## 2025-05-28 RX ADMIN — Medication 0.5 MILLIGRAM(S): at 15:30

## 2025-05-28 RX ADMIN — Medication 166.67 MILLIGRAM(S): at 23:21

## 2025-05-28 RX ADMIN — Medication 1000 MILLIGRAM(S): at 21:31

## 2025-05-28 RX ADMIN — POLYETHYLENE GLYCOL 3350 17 GRAM(S): 17 POWDER, FOR SOLUTION ORAL at 21:29

## 2025-05-28 RX ADMIN — OXYCODONE HYDROCHLORIDE 5 MILLIGRAM(S): 30 TABLET ORAL at 15:30

## 2025-05-28 RX ADMIN — Medication 400 MILLIGRAM(S): at 21:31

## 2025-05-28 RX ADMIN — Medication 2000 MILLIGRAM(S): at 19:48

## 2025-05-28 RX ADMIN — Medication 1 DROP(S): at 23:20

## 2025-05-28 RX ADMIN — Medication 0.5 MILLIGRAM(S): at 16:15

## 2025-05-28 NOTE — PATIENT PROFILE ADULT - FALL HARM RISK - RISK INTERVENTIONS

## 2025-05-28 NOTE — DISCHARGE NOTE PROVIDER - NSDCMRMEDTOKEN_GEN_ALL_CORE_FT
Alphagan P 0.15% ophthalmic solution: 1 drop(s) in each affected eye 2 times a day  amitriptyline 10 mg oral tablet: 20 milligram(s) orally once a day (at bedtime)  atorvastatin 20 mg oral tablet: 1 tab(s) orally once a day (at bedtime)  dicyclomine 20 mg oral tablet: 1 tab(s) orally 3 times a day (before meals)  dorzolamide-timolol 2.23%-0.68% (2%-0.5% base) ophthalmic solution: 1 drop(s) in each eye 2 times a day  Jardiance 25 mg oral tablet: 1 tab(s) orally once a day  levothyroxine 50 mcg (0.05 mg) oral tablet: 1 tab(s) orally once a day  MetFORMIN (Eqv-Glucophage XR) 500 mg oral tablet, extended release: 1 tab(s) orally 2 times a day  Multiple Vitamins oral tablet: 1 tab(s) orally once a day  pantoprazole 40 mg oral delayed release tablet: 1 tab(s) orally once a day  Systane Complete Optimal Dry Eye Relief ophthalmic solution: 1 drop(s) in each eye 4 times a day  torsemide 5 mg oral tablet: 1 tab(s) orally once a day   Alphagan P 0.15% ophthalmic solution: 1 drop(s) in each affected eye 2 times a day  amitriptyline 10 mg oral tablet: 20 milligram(s) orally once a day (at bedtime)  aspirin 81 mg oral delayed release tablet: 1 tab(s) orally 2 times a day Start after Xarelto  atorvastatin 20 mg oral tablet: 1 tab(s) orally once a day (at bedtime)  Bactrim  mg-160 mg oral tablet: 1 tab(s) orally 2 times a day Post op antibiotic prophylaxis  celecoxib 200 mg oral capsule: 1 cap(s) orally 2 times a day Take two hours after aspirin  dicyclomine 20 mg oral tablet: 1 tab(s) orally 3 times a day (before meals)  dorzolamide-timolol 2.23%-0.68% (2%-0.5% base) ophthalmic solution: 1 drop(s) in each eye 2 times a day  Jardiance 25 mg oral tablet: 1 tab(s) orally once a day  levothyroxine 50 mcg (0.05 mg) oral tablet: 1 tab(s) orally once a day  MetFORMIN (Eqv-Glucophage XR) 500 mg oral tablet, extended release: 1 tab(s) orally 2 times a day  MiraLax oral powder for reconstitution: 17 gram(s) orally once a day Start after discontinuing narcotics for constipation  ondansetron 4 mg oral tablet, disintegratin tab(s) orally 3 times a day x 3 days For nausea prn  oxyCODONE 5 mg oral tablet: 1 tab(s) orally 4 times a day for severe pain MDD: 4  pantoprazole 40 mg oral delayed release tablet: 1 tab(s) orally once a day  Senna 8.6 mg oral tablet: 2 tab(s) orally once a day (at bedtime) Stool softener  Systane Complete Optimal Dry Eye Relief ophthalmic solution: 1 drop(s) in each eye 4 times a day  torsemide 5 mg oral tablet: 1 tab(s) orally once a day  Tylenol Extra Strength 500 mg oral tablet: 2 tab(s) orally 3 times a day  Xarelto 10 mg oral tablet: 1 tab(s) orally once a day Take before starting Aspirin   Alphagan P 0.15% ophthalmic solution: 1 drop(s) in each affected eye 2 times a day  amitriptyline 10 mg oral tablet: 20 milligram(s) orally once a day (at bedtime)  aspirin 81 mg oral delayed release tablet: 1 tab(s) orally 2 times a day Start after Xarelto  atorvastatin 20 mg oral tablet: 1 tab(s) orally once a day (at bedtime)  Bactrim  mg-160 mg oral tablet: 1 tab(s) orally 2 times a day Post op antibiotic prophylaxis  cefadroxil 500 mg oral capsule: 1 cap(s) orally 2 times a day extended antibiotics after total joint replacement  celecoxib 200 mg oral capsule: 1 cap(s) orally 2 times a day Take two hours after aspirin  dicyclomine 20 mg oral tablet: 1 tab(s) orally 3 times a day (before meals)  dorzolamide-timolol 2.23%-0.68% (2%-0.5% base) ophthalmic solution: 1 drop(s) in each eye 2 times a day  Jardiance 25 mg oral tablet: 1 tab(s) orally once a day  levothyroxine 50 mcg (0.05 mg) oral tablet: 1 tab(s) orally once a day  MetFORMIN (Eqv-Glucophage XR) 500 mg oral tablet, extended release: 1 tab(s) orally 2 times a day  MiraLax oral powder for reconstitution: 17 gram(s) orally once a day Start after discontinuing narcotics for constipation  ondansetron 4 mg oral tablet, disintegratin tab(s) orally 3 times a day x 3 days For nausea prn  oxyCODONE 5 mg oral tablet: 1 tab(s) orally 4 times a day for severe pain MDD: 4  pantoprazole 40 mg oral delayed release tablet: 1 tab(s) orally once a day  Senna 8.6 mg oral tablet: 2 tab(s) orally once a day (at bedtime) Stool softener  Systane Complete Optimal Dry Eye Relief ophthalmic solution: 1 drop(s) in each eye 4 times a day  torsemide 5 mg oral tablet: 1 tab(s) orally once a day  Tylenol Extra Strength 500 mg oral tablet: 2 tab(s) orally 3 times a day  Xarelto 10 mg oral tablet: 1 tab(s) orally once a day Take before starting Aspirin

## 2025-05-28 NOTE — CONSULT NOTE ADULT - ASSESSMENT
IMPRESSION:      68 yo woman with history of glaucoma, HLD, T2DM, GI ulcer, hypothyroid, IBS, history of PE, bilateral knee OA - had right TKR in 2024 and did well. She is now  s/p left  TKA [facility] Dr. Singh.       *OA left knee   * s/p left TKA   POD#0  monitor VS   neurovascular checks   pain regimen PRN  Ice, elevate and rest   PT/OTeval  Bowel regimen  IVF hydration   C/W prophylactic ABT   diet as tolerated   PPI  VTE prophylaxis  monitor CBC/BMP  encourage IS  MRSA/MSSA positive on PST- patient will be on Vanco and Ancef, dc on extended Bactrim as per recs     *T2DM  - A1c 6.8  - on metformin and jardiance- hold for now  - ISS    *hypothyroid  - levothyroxine    *chronic BL edema  - on torsemide- hold for now     * Gastric Ulcer  - PPI    *IBS- on dicyclomine    *glaucoma  - c/w eye drops   - alphagan/ dorzolamide- timolol    * HLD- statin     * VTE prophylaxis  Venodynes  INC mobility  CAPRINI score - 12  Xarelto when cleared with Ortho       Thank you for the consult, will follow.

## 2025-05-28 NOTE — DISCHARGE NOTE PROVIDER - NSDCFUSCHEDAPPT_GEN_ALL_CORE_FT
[facility]  HNT PreAdmits  Scheduled Appointment: 05/28/2025    [facility] Physician [facility]  ORTHOSURG [address]  Scheduled Appointment: 06/17/2025    Noah Wilde  [facility] Physician [address]  Scheduled Appointment: 08/01/2025     [facility] Physician [facility]  ORTHOSURG [address] [facility]  Scheduled Appointment: 06/17/2025    Noah Wilde  [facility]  OPHTHALM [address] [facility]  Scheduled Appointment: 08/01/2025

## 2025-05-28 NOTE — PHYSICAL THERAPY INITIAL EVALUATION ADULT - GENERAL OBSERVATIONS, REHAB EVAL
Pt rec'd supine in bed on 2N Pt rec'd supine in bed on 2N, pt is Croatian speaking, dtr at bedside able to translate, pt with c/o pain, cooperative with PT, dressing to left knee C/D/I.

## 2025-05-28 NOTE — DISCHARGE NOTE PROVIDER - NSDCFUADDINST_GEN_ALL_CORE_FT
Discharge Instructions for Total Knee Arthroplasty    1. ACTIVITY: WBAT, Rolling walker, Daily PT. Gentle ROM 0-full as tolerated. Walk plenty.  Keep a bump (rolled towel or blanket) under your heel to keep leg straight while in bed or chair for 2 weeks.  2. CALL WITH: fever over 101, wound redness, drainage or open area, calf pain/calf swelling  3. BANDAGE: BANDAGE: Keep bandage on until POD14 (date). Change ONLY if saturated to Mepilex per Physical Therapist.   4. SHOWER: Remove outer ACE wrap and throw it away. Shower OK. No Tub baths.  5. STAPLES: There are NO Staples to remove. Sutures are Dissolvable.  6. BLOOD CLOT PREVENTION: Xarelto 10mg twice daily for 14 days then Aspirin 81mg twice daily for 14 days  7. GI: Continue Protonix daily x 30 days.  8. FOLLOW UP: Dr. Singh in 21 days. Call to schedule.   9. MEDICATIONS:  If going home, eRX sent to your pharmacy for .  DO NOT  or take the Rx if going to REHAB.   10. If you take any of the following supplements: Omega3 FAs (fish oil), Glucosamine chondroitin, Tumeric or CoQ10 please discontinue until 4 weeks post op or instructed by surgeon  11. ANTIBIOTICS**: PO Bactrim DS twice daily to complete a total of 7 days of antibiotics. eRx sent to the Pharmacy.   **ONLY if eRX sent, otherwise N/A   12. IMPORTANT: **Call the office if your ANTICOAGULATION medication ie XARELTO is not covered by your insurance  Discharge Instructions for Total Knee Arthroplasty    1. ACTIVITY: WBAT, Rolling walker, Daily PT. Gentle ROM 0-full as tolerated. Walk plenty.  Keep a bump (rolled towel or blanket) under your heel to keep leg straight while in bed or chair for 2 weeks.  2. CALL WITH: fever over 101, wound redness, drainage or open area, calf pain/calf swelling  3. BANDAGE: BANDAGE: Keep bandage on until POD14 (6/11/25). Change ONLY if saturated to Mepilex per Physical Therapist.   4. SHOWER: Remove outer ACE wrap and throw it away. Shower OK. No Tub baths.  5. STAPLES: There are NO Staples to remove. Sutures are Dissolvable.  6. BLOOD CLOT PREVENTION: Xarelto 10mg twice daily for 14 days then Aspirin 81mg twice daily for 14 days  7. GI: Continue Protonix daily x 30 days.  8. FOLLOW UP: Dr. Singh in 21 days. Call to schedule.   9. MEDICATIONS:  If going home, eRX sent to your pharmacy for .  DO NOT  or take the Rx if going to REHAB.   10. If you take any of the following supplements: Omega3 FAs (fish oil), Glucosamine chondroitin, Tumeric or CoQ10 please discontinue until 4 weeks post op or instructed by surgeon  11. ANTIBIOTICS**: PO Bactrim DS twice daily to complete a total of 7 days of antibiotics. eRx sent to the Pharmacy.   **ONLY if eRX sent, otherwise N/A   12. IMPORTANT: **Call the office if your ANTICOAGULATION medication ie XARELTO is not covered by your insurance  Discharge Instructions for Total Knee Arthroplasty    1. ACTIVITY: WBAT, Rolling walker, Daily PT. Gentle ROM 0-full as tolerated. Walk plenty.  Keep a bump (rolled towel or blanket) under your heel to keep leg straight while in bed or chair for 2 weeks.  2. CALL WITH: fever over 101, wound redness, drainage or open area, calf pain/calf swelling  3. BANDAGE: BANDAGE: Keep bandage on until POD14 (6/11/25). Change ONLY if saturated to Mepilex per Physical Therapist.   4. SHOWER: Remove outer ACE wrap and throw it away. Shower OK. No Tub baths.  5. STAPLES: There are NO Staples to remove. Sutures are Dissolvable.  6. BLOOD CLOT PREVENTION: Xarelto 10mg twice daily for 14 days then Aspirin 81mg twice daily for 14 days  7. GI: Continue your at home Protonix daily x 30 days while taking blood clot prevention medications (Xarelto then aspirin)  8. FOLLOW UP: Dr. Singh in 21 days. Call to schedule.   9. MEDICATIONS:  If going home, eRX sent to your pharmacy for .  DO NOT  or take the Rx if going to REHAB.   10. If you take any of the following supplements: Omega3 FAs (fish oil), Glucosamine chondroitin, Tumeric or CoQ10 please discontinue until 4 weeks post op or instructed by surgeon  11. ANTIBIOTICS**: PO Bactrim DS twice daily to complete a total of 7 days of antibiotics. eRx sent to the Pharmacy.   **ONLY if eRX sent, otherwise N/A   12. IMPORTANT: **Call the office if your ANTICOAGULATION medication ie XARELTO is not covered by your insurance  Discharge Instructions for Total Knee Arthroplasty    1. ACTIVITY: WBAT, Rolling walker, Daily PT. Gentle ROM 0-full as tolerated. Walk plenty.  Keep a bump (rolled towel or blanket) under your heel to keep leg straight while in bed or chair for 2 weeks.  2. CALL WITH: fever over 101, wound redness, drainage or open area, calf pain/calf swelling  3. BANDAGE: BANDAGE: Keep bandage on until POD14 (6/11/25). Change ONLY if saturated to Mepilex per Physical Therapist.   4. SHOWER: Remove outer ACE wrap and throw it away. Shower OK. No Tub baths.  5. STAPLES: There are NO Staples to remove. Sutures are Dissolvable.  6. BLOOD CLOT PREVENTION: Xarelto 10mg twice daily for 14 days then Aspirin 81mg twice daily for 14 days  7. GI: Continue your at home Protonix daily x 30 days while taking blood clot prevention medications (Xarelto then aspirin)  8. FOLLOW UP: Dr. Singh in 21 days. Call to schedule.   9. MEDICATIONS:  If going home, eRX sent to your pharmacy for .  DO NOT  or take the Rx if going to REHAB.   10. If you take any of the following supplements: Omega3 FAs (fish oil), Glucosamine chondroitin, Tumeric or CoQ10 please discontinue until 4 weeks post op or instructed by surgeon  11. ANTIBIOTICS**: PO Bactrim DS and Duricef twice daily to complete a total of 7 days of antibiotics. eRx sent to the Pharmacy.   **ONLY if eRX sent, otherwise N/A   12. IMPORTANT: **Call the office if your ANTICOAGULATION medication ie XARELTO is not covered by your insurance

## 2025-05-28 NOTE — PATIENT PROFILE ADULT - FUNCTIONAL ASSESSMENT - DAILY ACTIVITY SECTION LABEL
[FreeTextEntry1] : Dear Dr. TY Mcadams (PCP) Dear Dr. Bhavik Mascorro (Nephrologist)  Chief Complaint: BPH, previously had 287cc gland with successful PAE in 9/2020. He had attempted repeat PAE in 5/2023 that was unsuccessful. S/P HoLEP 6/30/23 with Dr. Crain  Date of first visit: 07/21/2020  JUAN PEDROZA is a 89 year old  man with hx of longstanding LUTS (obstructive and irritative), nephrolithiasis s/p ESWL, CKD, and BPH 287cc gland (on 3D) s/p bilateral PAE with particles and coils on 9/11/20 now 169cc. Sent for NM renal lasix scan. He has bladder outlet obstruction and severe hydro. The cystoscopy 4/24/23 - negative for stricture but debris within the bladder. A catheter was placed on 5/9/23 for retention and moderate to severe left hydronephrosis and hydroureter. He is now s/p bilateral PAE with NBCA on 5/23/23. He is now s/p HoLEP 6/29/23. He's off all BPH medications. He is very happy.  He has ED but is not sexually active. Approximately 20 yrs ago he had a penile prosthesis placed by Dr. Live, which stopped functioning 7 yrs ago.  In Aug 2024, patient admitted for pneumonia and sepsis  CT Hx 03/29/2023 Renal [place] - Persistent severe left hydroureteronephrosis with findings of prostatic enlargement and chronic bladder outlet obstruction. Negative for nephroureterolithiasis.  7/22/2020 CT Abdomen/ Pelvis - No evidence of nephroureterolithiasis. 4.1 cm rounded soft tissue density in the mid left kidney. Cholelithiasis. Markedly enlarged prostate gland. Large Hiatal hernia.  MRI Hx MRI Abdomen and Pelvis -[facility] on 3/13/2023. No right hydronephrosis. Moderate to severe left hydronephrosis and left hydroureter. Duplex left kidney. Markedly enlarged prostate-BPH. Trabeculated and sacculated urinary bladder. Penile prosthesis. Cholelithiasis. Large hiatal hernia..  MRI at [facility] 02/20/2023. 169.7mL prostate with PIRADS 2 No targetable lesion. No LAD No EPE, No Bony Lesions. The images have been reviewed and clinical implications discussed with the patient.  MRI at [facility] on 8/18/2021. 254 cc (190 on 3D) prostate with no suspicious prostate lesions. Several extruded BPH nodules. Stable MRI from 8/2020. No LAD No EPE, No Bony Lesions.  MRI ([facility]) read 08/04/2020. 360 cc (287 on 3D), PSAD 0.18 (64/360), 2.2 cm nodule in the left periurethral position (series 3, image 6), similar to BPH findings. Possible seminal abnormality but susp of CaP solitary focus low No LAD. No EPE. No Bony Lesions. The images have been reviewed and clinical implications discussed with the patient.  Nuclear Med Hx 04/10/2023 - Symmetric renal function. There is dilatation of the left renal pelvis and ureter and incomplete bladder emptying. Although the response to Lasix was suboptimal, there are several pieces of evidence that there is no significant obstruction at the present time and that hydro is 2/2 DEGROOT.  Biopsy Hx 3/13/01--BPH  PSA hx: 7.8 2/28/22 - corrected for 5ARI. PSAD 0.04 ng/ml/cc 14.44 ng/mL -- 6/3/21- in the setting of an candida infx, off 5ARI 5.94 ng/mL -- 02/02/2021- off 5ARI 32.40 ng/mL -- 07/29/20 (recent + urine culture) 5.5 ng/mL -- 11/03/19- not corrected for 5ARI 9.9 ng/mL -- 02/23/09- not corrected for 5ARI 3.9 ng/mL -- 02/18/00- not corrected for 5ARI  10/28/2024 IPSS  8  QOL 1 Uroflow max flow 3.9 ml/s, ave 4.4 ml/s, VV53.3cc PVR: 347cc  04/24/2023 IPSS QOL LEONORA  cc, Q Max 11.9 ml/s  03/20/2023 IPSS 17 QOL 3 LEONORA 1-NSA Flow/PVR: vv 60cc not valid. PVR 101cc  03/01/2023 IPSS 15 QOL 3 LEONORA 1- NSA Flow/PVR: flow machine not working. PVR 256cc  02/15/2023 IPSS 11 QOL 3 LEONORA NSA Flow/PVR  8/26/2022 IPSS 8 QOL 1 LEONORA 1-NSA Flow/PVR: Peak 11.8 ml/s, avg 4.3 ml/s, vv 79ml not valid. PVR 103ml  02/28/2022 IPSS 12 QOL 2 LEONORA 5 Flow/PVR: Peak 10 ml/s, vv 92 not valid, PVR 35 ml  8/23/2021 IPSS 7 QOL 1 LEONORA 1-NSA Flow/PVR: Peak 17.4 ml/s, avg 8.4 ml/s, vv 211 ml. PVR 142ml  6/30/21 IPSS 7 QOL 1  cc, Uroflow avg 4.7 QMax 16.6 ml/s - double void  1/26/2021 IPSS 6 QOL 1 LEONORA 1  7/28/2020 IPSS 9 QOL 2 LEONORA 1  The patient denies fevers, chills, nausea and or vomiting and no unexplained weight loss.  All pertinent laboratory, films and physician notes were reviewed. .

## 2025-05-28 NOTE — PHYSICAL THERAPY INITIAL EVALUATION ADULT - MODALITIES TREATMENT COMMENTS
Pt educated on total knee replacement movement quality, falls risk reduction, therex review and the overall impact on the pt's ADLs and safety. The following preoperative PROMs were reviewed, the HOOS/KOOS and the PROMIS-[employer] questionnaire. The RAPT score was assessed to help with discharge destination planning.

## 2025-05-28 NOTE — DISCHARGE NOTE PROVIDER - CARE PROVIDER_API CALL
Enmanuel Singh  Adult Reconstructive Orthopaedic Surgery  [address] [place] [address]  Phone: [phone]  Fax: [phone]  Follow Up Time:

## 2025-05-28 NOTE — PATIENT PROFILE ADULT - MEDICATIONS/VISITS
Physical Therapy Daily Treatment    Visit Count: 8    Referred by: Zaria Keller MD; Next provider visit (if known/scheduled): 2022  Medical Diagnosis (from order): 386.11 (ICD-9-CM) - H81.10 (ICD-10-CM) - Benign paroxysmal positional vertigo, unspecified laterality     Treatment Diagnosis: dizziness and vestibular symptoms with motion sensitivity, dysequilibrium, gaze instability, impaired posture, impaired gait/locomotion dficits, impaired mobility and impaired balance     Date of onset/injury: chronic (2+ years)  Diagnosis Precautions: hypothyroidism, diabetes mellitus, dizziness, HTN, osteopenia, vertigo, history of falls    SUBJECTIVE   Jarett reports that she was in the ED due to constipation. She and her daughter report that patient's sister  recently; Jarett reports that she was feeling shaking and weak overall at the time of her sister's death. Reports that she is using the cane to walk.    She reports a lot of emotional challenge; states that she has support from family and that she has improved emotionally the past 2-3 days.    An [employer] video  was utilized during this session using the patient's primary/preferred language: Khmer, [facility] ([id number]).     Current Pain (0-10 scale): none to report   Functional Change: see above    OBJECTIVE   -patient ambulates with SPC, decreased gait speed    Balance/Gait Tests:   5 times Sit to Stand: 21 seconds with improvement to 16 seconds  Norms: 70-79 years: 12.6 seconds  Greater than 16 seconds indicates fall risk in people with Parkinson's    Balance:  Romberg eyes open: 30 seconds  Romberg eyes closed: 30 seconds  Staggered stance: 30 seconds  Staggered stance, eyes closed: 30 seconds    Treatment   Therapeutic Exercise:   Instruct patient to perform following therapeutic exercise with appropriate technique:  -upright sitting with abdominal bracing: cues for transversus abdominus muscle activiation  -seated marching: 10 repetitions yong KLEIN  2 sets  -seated LAQ: 10 repetitions yong LE, 2 sets    Neuromuscular Reeducation:  -perform skilled tests and measures as indicated above to facilitate optimal patient care and progression of interventions  -educate and instruct patient on functional implications of objective test results    Instruct patient to perform following neuromuscular re-education activities with appropriate technique:  -Romberg eyes open: 30 second holds, 2 reps  -Romberg eyes closed: 30 second holds, 2 reps  -Staggered stance: 30 second holds, 2 repetitions   -Staggered stance, eyes closed: 30 second holds, 4 repetitions   -Forward stepping onto 3 inch with retro step back: 10 repetitions yong LE, alternating LE lead, 3 sets       -progress with no UE support  -alternating LE stepping forward with contralateral UE shoulder flexion/extension AROM      Skilled input: verbal instruction/cues, tactile instruction/cues, posture correction, education/instruction on progression of balance training for optimal gait, as detailed above    Home Program:   Add:  -seated marching: 10 repetitions yong LE, 2 sets  -seated LAQ: 10 repetitions yong LE, 2 sets    Access Code: [email or web address]  URL: [email or web address]  Prepared by: Ann Townsend     Sit to Stand - 1-2 x daily - 7 x weekly - 2 sets - 5 reps  Mini Squat with Counter Support - 1-2 x daily - 7 x weekly - 2 sets - 5-10 repetitions  Romberg Stance with Eyes Closed - 2-3 x daily - 7 x weekly - 2 reps - 30 seconds hold  Alternating Step Forward with Support - 2-3 x daily - 7 x weekly - 2-3 sets - 10 reps  Seated Cervical Sidebending Stretch - 2-3 x daily - 7 x weekly - 2 reps - 30 seconds hold  Standing Cervical Rotation AROM with Overpressure - 2-3 x daily - 7 x weekly - 2 reps - 30 seconds hold    Writer verbally educated the patient and received verbal consent from the patient on hand placement, positioning of patient, and techniques to be performed today including therapist  position for techniques, hand placement and palpation for techniques, use of gait belt as described above and how they are pertinent to the patient's plan of care.      Suggestions for next session as indicated: progress per plan of care, additional testing as indicated and progression of interventions, review and progress home exercise program, gait training with various surfaces and obstacle negotiation, review and further instruction in functional squat and reaching, progress balance training to various surfaces    ASSESSMENT   Will continues to have impaired functional muscle strength and dynamic balance that increases her risk for falls. Note good and appropriate improvement in patient's static balance and righting reactions as compared to initial evaluation. She continues to require frequent encouragement for optimal performance of balance training and stepping activities without UE support. Jarett will continue to functionally benefit from active participation in skilled physical therapy interventions to facilitate optimal functional balance, muscle strength, and gait pattern for improved patient safety, independence, and decreased risk for falls during ADLs and functional mobility activities in her functional environments.  Pain after treatment (patient reported, 0-10 scale): does not report  Result of above outlined education: Verbalizes understanding, Demonstrates understanding and Needs reinforcement    Therapy procedure time and total treatment time can be found documented on the Time Entry flowsheet   no

## 2025-05-28 NOTE — PHYSICAL THERAPY INITIAL EVALUATION ADULT - RANGE OF MOTION EXAMINATION, REHAB EVAL
left knee 0-90 DEG/bilateral upper extremity ROM was WFL (within functional limits)/bilateral lower extremity ROM was WFL (within functional limits)

## 2025-05-28 NOTE — CONSULT NOTE ADULT - NS ATTEND AMEND GEN_ALL_CORE FT
Patient seen and examined with HANSA Christie.  I was physically present for the key portions of the evaluation and management (E/M) service provided.  I agree with the above history, physical, and plan which I have reviewed and edited where appropriate.     68 yo woman with history of glaucoma, HLD, T2DM, GI ulcer, hypothyroid, IBS, history of PE, bilateral knee OA - had right TKR in 2024 and did well. She is now  s/p left  TKA wt Dr. Singh.     resp cta b/l  ext - left knee dressing in place     *OA left knee   * s/p left TKA   case d/w ortho team   POD#0  monitor VS   neurovascular checks   pain regimen PRN  Ice, elevate and rest   PT/OTeval  Bowel regimen  IVF hydration   C/W prophylactic ABT   diet as tolerated   PPI  VTE prophylaxis  monitor CBC/BMP  encourage IS  MRSA/MSSA positive on PST- patient will be on Vanco and Ancef, dc on extended Bactrim as per recs     *T2DM  - A1c 6.8  - on metformin and jardiance- hold for now  - ISS    *hypothyroid  - levothyroxine    *chronic BL edema  - on torsemide- hold for now     * Gastric Ulcer  - PPI    *IBS- on dicyclomine    *glaucoma  - c/w eye drops   - alphagan/ dorzolamide- timolol    * HLD- statin     * VTE prophylaxis  Venodynes  INC mobility  CAPRINI score - 12  Xarelto when cleared with Ortho       Thank you for the consult, will follow.

## 2025-05-28 NOTE — CONSULT NOTE ADULT - SUBJECTIVE AND OBJECTIVE BOX
PCP: Dr Ceron     CHIEF COMPLAINT: worsening left  knee pain     HISTORY OF THE PRESENT ILLNESS:     68 yo woman with history of glaucoma, HLD, T2DM, GI ulcer, hypothyroid, IBS, history of PE, bilateral knee OA - had right TKR in 2024 and did well. She is now  s/p left  TKA wt Dr. Singh. Patient with progressive pain with ambulation, climbing stairs and increasing  pain at night. She has tried conservative management but it did not help. And since she has done well with the right TKR, a decision was made to do elective right knee surgery.   Pt was seen in PACU, in NAD. Evelina BROWN CP. Hospitalist consulted for post op medical management.     PAST MEDICAL & SURGICAL HISTORY:  Arthritis  Glaucoma  Hyperlipidemia  Type II diabetes mellitus  Lower extremity edema  Stomach ulcer  Seasonal asthma  Pulmonary embolism  Hypothyroid  Osteoarthritis  Left knee pain  History of left oophorectomy  right TKR     FAMILY HISTORY:  FH: heart disease (Grandparent)  FH: heart disease (Father, Mother)  Family history of breast cancer (Sibling)    Social History: denies alcohol or drug use  denies smoking     Allergies  IV Contrast (Short breath)  Intolerances    Home Medications:  Alphagan P 0.15% ophthalmic solution: 1 drop(s) in each affected eye 2 times a day (28 May 2025 10:02)  amitriptyline 10 mg oral tablet: 20 milligram(s) orally once a day (at bedtime) (28 May 2025 10:02)  atorvastatin 20 mg oral tablet: 1 tab(s) orally once a day (at bedtime) (28 May 2025 10:02)  dicyclomine 20 mg oral tablet: 1 tab(s) orally 3 times a day (before meals) (28 May 2025 10:02)  dorzolamide-timolol 2.23%-0.68% (2%-0.5% base) ophthalmic solution: 1 drop(s) in each eye 2 times a day (28 May 2025 10:02)  Jardiance 25 mg oral tablet: 1 tab(s) orally once a day (28 May 2025 10:02)  levothyroxine 50 mcg (0.05 mg) oral tablet: 1 tab(s) orally once a day (28 May 2025 10:02)  MetFORMIN (Eqv-Glucophage XR) 500 mg oral tablet, extended release: 1 tab(s) orally 2 times a day (14 May 2025 14:33)  pantoprazole 40 mg oral delayed release tablet: 1 tab(s) orally once a day (28 May 2025 10:02)  Systane Complete Optimal Dry Eye Relief ophthalmic solution: 1 drop(s) in each eye 4 times a day (28 May 2025 10:02)  torsemide 5 mg oral tablet: 1 tab(s) orally once a day (28 May 2025 10:02)    Vital Signs Last 24 Hrs  T(C): 36.4 (28 May 2025 10:09), Max: 36.4 (28 May 2025 10:09)  T(F): 97.5 (28 May 2025 10:09), Max: 97.5 (28 May 2025 10:09)  HR: 60 (28 May 2025 10:09) (60 - 60)  BP: 111/69 (28 May 2025 10:09) (111/69 - 111/69)  BP(mean): --  RR: 16 (28 May 2025 10:09) (16 - 16)  SpO2: 100% (28 May 2025 10:09) (100% - 100%)      REVIEW OF SYSTEMS:   All 10 systems reviewed in detailed and found to be negative with the exception of what has already been described above    MEDICATIONS  (STANDING):  dextrose 5%. 1000 milliLiter(s) (50 mL/Hr) IV Continuous <Continuous>  dextrose 5%. 1000 milliLiter(s) (100 mL/Hr) IV Continuous <Continuous>  dextrose 50% Injectable 25 Gram(s) IV Push once  dextrose 50% Injectable 12.5 Gram(s) IV Push once  dextrose 50% Injectable 25 Gram(s) IV Push once  glucagon  Injectable 1 milliGRAM(s) IntraMuscular once  tranexamic acid 2% Topical Irrigation 1 Application(s) IntraArticular once    MEDICATIONS  (PRN):  dextrose Oral Gel 15 Gram(s) Oral once PRN Blood Glucose LESS THAN 70 milliGRAM(s)/deciliter  fentaNYL    Injectable 50 MICROGram(s) IV Push every 5 minutes PRN Severe Pain (7 - 10)  HYDROmorphone  Injectable 0.5 milliGRAM(s) IV Push every 10 minutes PRN Moderate Pain (4 - 6)  ondansetron Injectable 4 milliGRAM(s) IV Push once PRN Nausea and/or Vomiting  oxyCODONE    IR 5 milliGRAM(s) Oral once PRN Mild Pain (1 - 3)      PE:  Constitutional: NAD, laying in bed- sleepy - just got out of OR    HEENT: NC/AT  Back: no tenderness  Respiratory: respirations even and non labored, LCTA- diminished   Cardiovascular: S1S2 regular, no murmurs  Abdomen: soft, not tender, not distended, positive BS  Genitourinary: voiding  Musculoskeletal: no muscle tenderness, no joint swelling or tenderness- left knee ace bandage   Extremities: no pedal edema   Neurological: no focal deficits    LABS:  post op labs- pending

## 2025-05-28 NOTE — DISCHARGE NOTE PROVIDER - ATTENDING ATTESTATION STATEMENT
Called patient and informed of H Pylori results. Reviewed Pylera medication to take 3 caps po QID x 10 days in conjunction with omeprazole 20 mg po bid x 10 days, for effective therapy. Patient agrees to come in tomorrow and sign TPC paperwork to get Pylera. He states that he has omeprazole 20 mg at home and understands it is OTC also. Patient verbally repeated instructions and information understood. I have personally seen and examined the patient. I have collaborated with and supervised the

## 2025-05-28 NOTE — DISCHARGE NOTE PROVIDER - CARE PROVIDERS DIRECT ADDRESSES
[email or web address]

## 2025-05-28 NOTE — DISCHARGE NOTE PROVIDER - HOSPITAL COURSE
Orthopedic H&P:  Pt is a 69y Female     Past Medical History:  Arthritis  Glaucoma  Hyperlipidemia  Type II diabetes mellitus  Lower extremity edema  Stomach ulcer  Seasonal asthma  Pulmonary embolism  Hypothyroid  Osteoarthritis  Left knee pain    Now s/p Total Knee Arthroplasty. Pt is afebrile with stable vital signs. Pain is controlled. Alert and Oriented. Exam intact EHL FHL TA GS, +DP. Dressing is clean and dry.    Hospital Course:  Patient presented to [facility] medically cleared for elective Knee Replacement Surgery, having failed outpatient conservative management. Prophylactic antibiotics were started before the procedure and continued for 24 hours. Pt received an adductor canal block in the OR for analgesia per anesthesia protocol. They were admitted after surgery to the orthopedic floor.   There were no complications during the hospital stay. Appropriate home medications were continued.     Routine consults were obtained from Physical Therapy for twice daily PT and from the Hospitalist for Medical Co-management. Patient was placed on anticoagulation.  Pertinent home medications were continued.  Daily labs were followed.      On POD 0 pt was stable overnight. No major events post op. Pt received twice daily PT. The plan is for DC to home with home PT. The orthopedic Attending is aware and agrees.    The patient's following condition(s) were actively treated or managed during the hospital stay:  Acute post op blood loss anemia due to surgery that was not clinically significant, required no intervention and was monitored.  Diabetes  HTN  Pulmonary disease- seasonal asthma  Hx of DVT/VTE    The patient had no post-operative complications and clinically progressed faster than anticipated.   The patient met criteria for discharge before the 2nd night of the stay. The patient was appropriately and safely discharged home with home PT.    ******ABOVE NOTE IN PREPARATION FOR DISPO PLANNING*******  ******ABOVE NOTE IN PREPARATION FOR DISPO PLANNING*******  ******ABOVE NOTE IN PREPARATION FOR DISPO PLANNING*******           Orthopedic H&P:  Pt is a 69y Female     Past Medical History:  Arthritis  Glaucoma  Hyperlipidemia  Type II diabetes mellitus  Lower extremity edema  Stomach ulcer  Seasonal asthma  Pulmonary embolism  Hypothyroid  Osteoarthritis  Left knee pain    Now s/p Total Knee Arthroplasty. Pt is afebrile with stable vital signs. Pain is controlled. Alert and Oriented. Exam intact EHL FHL TA GS, +DP. Dressing is clean and dry.    Hospital Course:  Patient presented to [facility] medically cleared for elective Knee Replacement Surgery, having failed outpatient conservative management. Prophylactic antibiotics were started before the procedure and continued for 24 hours. Pt received an adductor canal block in the OR for analgesia per anesthesia protocol. They were admitted after surgery to the orthopedic floor.   There were no complications during the hospital stay. Appropriate home medications were continued.     Routine consults were obtained from Physical Therapy for twice daily PT and from the Hospitalist for Medical Co-management. Patient was placed on anticoagulation.  Pertinent home medications were continued.  Daily labs were followed.      On POD 0 pt was stable overnight. No major events post op. Pt received twice daily PT. The plan is for DC to home with home PT. The orthopedic Attending is aware and agrees.    The patient's following condition(s) were actively treated or managed during the hospital stay:  Acute post op blood loss anemia due to surgery that was not clinically significant, required no intervention and was monitored.  Diabetes  HTN  Pulmonary disease- seasonal asthma  Hx of DVT/VTE    The patient had no post-operative complications and clinically progressed faster than anticipated.   The patient met criteria for discharge before the 2nd night of the stay. The patient was appropriately and safely discharged home with home PT.

## 2025-05-28 NOTE — PHYSICAL THERAPY INITIAL EVALUATION ADULT - PERTINENT HX OF CURRENT PROBLEM, REHAB EVAL
68 yo woman with history of glaucoma, HLD, T2DM, GI ulcer, hypothyroid, IBS, history of PE, bilateral knee OA - had right TKR in 2024 and did well. She is now  s/p left  TKA wt Dr. Singh. Patient with progressive pain with ambulation, climbing stairs and increasing  pain at night. She has tried conservative management but it did not help. And since she has done well with the right TKR, a decision was made to do elective left knee surgery.

## 2025-05-29 ENCOUNTER — TRANSCRIPTION ENCOUNTER (OUTPATIENT)
Age: 70
End: 2025-05-29

## 2025-05-29 VITALS
HEART RATE: 70 BPM | RESPIRATION RATE: 18 BRPM | OXYGEN SATURATION: 94 % | TEMPERATURE: 97 F | DIASTOLIC BLOOD PRESSURE: 55 MMHG | SYSTOLIC BLOOD PRESSURE: 109 MMHG

## 2025-05-29 LAB
ANION GAP SERPL CALC-SCNC: 8 MMOL/L — SIGNIFICANT CHANGE UP (ref 5–17)
BUN SERPL-MCNC: 18 MG/DL — SIGNIFICANT CHANGE UP (ref 7–23)
CALCIUM SERPL-MCNC: 8.8 MG/DL — SIGNIFICANT CHANGE UP (ref 8.5–10.1)
CHLORIDE SERPL-SCNC: 104 MMOL/L — SIGNIFICANT CHANGE UP (ref 96–108)
CO2 SERPL-SCNC: 21 MMOL/L — LOW (ref 22–31)
CREAT SERPL-MCNC: 0.86 MG/DL — SIGNIFICANT CHANGE UP (ref 0.5–1.3)
EGFR: 74 ML/MIN/1.73M2 — SIGNIFICANT CHANGE UP
EGFR: 74 ML/MIN/1.73M2 — SIGNIFICANT CHANGE UP
GLUCOSE BLDC GLUCOMTR-MCNC: 153 MG/DL — HIGH (ref 70–99)
GLUCOSE SERPL-MCNC: 146 MG/DL — HIGH (ref 70–99)
HCT VFR BLD CALC: 34.2 % — LOW (ref 34.5–45)
HGB BLD-MCNC: 10.7 G/DL — LOW (ref 11.5–15.5)
MCHC RBC-ENTMCNC: 20 PG — LOW (ref 27–34)
MCHC RBC-ENTMCNC: 31.3 G/DL — LOW (ref 32–36)
MCV RBC AUTO: 63.8 FL — LOW (ref 80–100)
NRBC # BLD AUTO: 0 K/UL — SIGNIFICANT CHANGE UP (ref 0–0)
NRBC # FLD: 0 K/UL — SIGNIFICANT CHANGE UP (ref 0–0)
PLATELET # BLD AUTO: 261 K/UL — SIGNIFICANT CHANGE UP (ref 150–400)
PMV BLD: SIGNIFICANT CHANGE UP FL (ref 7–13)
POTASSIUM SERPL-MCNC: 3.9 MMOL/L — SIGNIFICANT CHANGE UP (ref 3.5–5.3)
POTASSIUM SERPL-SCNC: 3.9 MMOL/L — SIGNIFICANT CHANGE UP (ref 3.5–5.3)
RBC # BLD: 5.36 M/UL — HIGH (ref 3.8–5.2)
RBC # FLD: 20.8 % — HIGH (ref 10.3–14.5)
SODIUM SERPL-SCNC: 133 MMOL/L — LOW (ref 135–145)
WBC # BLD: 9.59 K/UL — SIGNIFICANT CHANGE UP (ref 3.8–10.5)
WBC # FLD AUTO: 9.59 K/UL — SIGNIFICANT CHANGE UP (ref 3.8–10.5)

## 2025-05-29 PROCEDURE — 99232 SBSQ HOSP IP/OBS MODERATE 35: CPT

## 2025-05-29 RX ORDER — SODIUM CHLORIDE 9 G/1000ML
500 INJECTION, SOLUTION INTRAVENOUS ONCE
Refills: 0 | Status: COMPLETED | OUTPATIENT
Start: 2025-05-29 | End: 2025-05-29

## 2025-05-29 RX ADMIN — CELECOXIB 200 MILLIGRAM(S): 50 CAPSULE ORAL at 05:10

## 2025-05-29 RX ADMIN — Medication 1 TABLET(S): at 05:10

## 2025-05-29 RX ADMIN — OXYCODONE HYDROCHLORIDE 5 MILLIGRAM(S): 30 TABLET ORAL at 09:59

## 2025-05-29 RX ADMIN — SODIUM CHLORIDE 500 MILLILITER(S): 9 INJECTION, SOLUTION INTRAVENOUS at 01:10

## 2025-05-29 RX ADMIN — BRIMONIDINE TARTRATE 1 DROP(S): 1.5 SOLUTION/ DROPS OPHTHALMIC at 05:12

## 2025-05-29 RX ADMIN — Medication 40 MILLIGRAM(S): at 05:11

## 2025-05-29 RX ADMIN — Medication 1000 MILLIGRAM(S): at 05:11

## 2025-05-29 RX ADMIN — CEFADROXIL 500 MILLIGRAM(S): 500 CAPSULE ORAL at 09:12

## 2025-05-29 RX ADMIN — DEXAMETHASONE 101.6 MILLIGRAM(S): 0.5 TABLET ORAL at 05:11

## 2025-05-29 RX ADMIN — Medication 400 MILLIGRAM(S): at 05:11

## 2025-05-29 RX ADMIN — OXYCODONE HYDROCHLORIDE 5 MILLIGRAM(S): 30 TABLET ORAL at 09:12

## 2025-05-29 RX ADMIN — Medication 50 MICROGRAM(S): at 05:10

## 2025-05-29 RX ADMIN — Medication 1 DROP(S): at 05:12

## 2025-05-29 RX ADMIN — Medication 2000 MILLIGRAM(S): at 03:43

## 2025-05-29 RX ADMIN — RIVAROXABAN 10 MILLIGRAM(S): 10 TABLET, FILM COATED ORAL at 09:13

## 2025-05-29 RX ADMIN — INSULIN LISPRO 1: 100 INJECTION, SOLUTION INTRAVENOUS; SUBCUTANEOUS at 07:57

## 2025-05-29 NOTE — PHARMACOTHERAPY INTERVENTION NOTE - COMMENTS
Admission medication reconciliation POD1 
CRS 12 (BMI 41.1)  MSSA+ MRSA+ --> vancomycin 15mg/kg within 2 hours of incision and repeat x1 dose 12 hours later PLUS cefazolin 2G within 1 hour of incision and q8h x2  extended duration abx: Bactrim DS BID x7d  pls confirm if patient takes dicyclomine 10mg or 20mg TID

## 2025-05-29 NOTE — PROGRESS NOTE ADULT - SUBJECTIVE AND OBJECTIVE BOX
Orthopedics Post-op Check    POD 0 Total Knee Arthroplasty  Stable on 2N. Pain is controlled, feeling well. No nausea or vomiting. Has been OOB with PT and ambulated, Voided urine.  Vital Signs Last 24 Hrs  T(C): 36.4 (05-28-25 @ 16:55), Max: 36.7 (05-28-25 @ 16:00)  T(F): 97.5 (05-28-25 @ 16:55), Max: 98.1 (05-28-25 @ 16:00)  HR: 58 (05-28-25 @ 16:55) (56 - 68)  BP: 127/66 (05-28-25 @ 16:55) (96/66 - 129/78)  BP(mean): --  RR: 18 (05-28-25 @ 16:55) (12 - 18)  SpO2: 94% (05-28-25 @ 16:55) (94% - 100%)                        11.3   9.10  )-----------( 250      ( 28 May 2025 14:33 )             35.9         Exam:  NAD AAOx3  Dressing clean and dry  +EHL FHL TA GS  SILT toes 1-5  +DP  Calf Soft NT    A/P:  Stable POD 0 LEFT Total Knee Arthroplasty  -Analgesia  -Ppx ABX plus extended abx DUricef Bactrim  -DVT PE ppx  -SCDs  -Incentive spirometry  -OOB PT
Patient seen and examined at bedside. Patient reports pain well controlled on medications. No acute events overnight. Pt denies fevers, chills, new onset numbness, weakness or tingling in the extremities.    T(C): 36.4 (05-29-25 @ 04:00), Max: 36.7 (05-28-25 @ 16:00)  T(F): 97.5 (05-29-25 @ 04:00), Max: 98.1 (05-28-25 @ 16:00)  HR: 60 (05-29-25 @ 04:00) (56 - 76)  BP: 105/59 (05-29-25 @ 04:00) (91/49 - 129/78)  RR: 18 (05-29-25 @ 04:00) (12 - 18)  SpO2: 97% (05-29-25 @ 04:00) (93% - 100%)    Exam:  NAD AAOx3  Dressing clean and dry  +EHL FHL TA GS  SILT toes 1-5  +DP  Calf Soft NT    A/P:  Stable POD 1 LEFT Total Knee Arthroplasty  -Analgesia  -Ppx ABX plus extended abx DUricef Bactrim  -DVT PE ppx  -SCDs  -Incentive spirometry  -OOB PT  -Home today after 2 sessions of PT
    68 yo woman with history of glaucoma, HLD, T2DM, GI ulcer, hypothyroid, IBS, history of PE, bilateral knee OA - had right TKR in 2024 and did well. She is now  s/p left  TKA [facility] Dr. Singh.     5/29- Patient was seen and examined. VSS. No acute overnight events. Denies fever, pain or SOB. Tolerating diet.         Vital Signs Last 24 Hrs  T(C): 36.2 (29 May 2025 08:15), Max: 36.7 (28 May 2025 16:00)  T(F): 97.1 (29 May 2025 08:15), Max: 98.1 (28 May 2025 16:00)  HR: 70 (29 May 2025 08:15) (56 - 76)  BP: 109/55 (29 May 2025 08:15) (91/49 - 129/78)  BP(mean): 68 (29 May 2025 08:15) (68 - 71)  RR: 18 (29 May 2025 08:15) (12 - 18)  SpO2: 94% (29 May 2025 08:15) (93% - 100%)    Parameters below as of 29 May 2025 08:15  Patient On (Oxygen Delivery Method): room air      REVIEW OF SYSTEMS:   All 10 systems reviewed in detailed and found to be negative with the exception of what has already been described above    MEDICATIONS  (STANDING):  acetaminophen     Tablet .. 1000 milliGRAM(s) Oral every 8 hours  acetaminophen   IVPB .. 1000 milliGRAM(s) IV Intermittent every 8 hours  amitriptyline 10 milliGRAM(s) Oral at bedtime  artificial  tears Solution 1 Drop(s) Both EYES four times a day  atorvastatin 20 milliGRAM(s) Oral at bedtime  brimonidine 0.2% Ophthalmic Solution 1 Drop(s) Both EYES two times a day  cefadroxil 500 milliGRAM(s) Oral two times a day  celecoxib 200 milliGRAM(s) Oral every 12 hours  dextrose 5%. 1000 milliLiter(s) (100 mL/Hr) IV Continuous <Continuous>  dextrose 5%. 1000 milliLiter(s) (50 mL/Hr) IV Continuous <Continuous>  dextrose 50% Injectable 25 Gram(s) IV Push once  dextrose 50% Injectable 12.5 Gram(s) IV Push once  dextrose 50% Injectable 25 Gram(s) IV Push once  dorzolamide 2%/timolol 0.5% Ophthalmic Solution 1 Drop(s) Both EYES once  glucagon  Injectable 1 milliGRAM(s) IntraMuscular once  insulin lispro (ADMELOG) corrective regimen sliding scale   SubCutaneous three times a day before meals  insulin lispro (ADMELOG) corrective regimen sliding scale   SubCutaneous at bedtime  lactated ringers. 1000 milliLiter(s) (75 mL/Hr) IV Continuous <Continuous>  levothyroxine 50 MICROGram(s) Oral daily  pantoprazole    Tablet 40 milliGRAM(s) Oral before breakfast  polyethylene glycol 3350 17 Gram(s) Oral at bedtime  rivaroxaban 10 milliGRAM(s) Oral daily  senna 2 Tablet(s) Oral at bedtime  tranexamic acid 2% Topical Irrigation 1 Application(s) IntraArticular once  trimethoprim  160 mG/sulfamethoxazole 800 mG 1 Tablet(s) Oral two times a day    MEDICATIONS  (PRN):  dextrose Oral Gel 15 Gram(s) Oral once PRN Blood Glucose LESS THAN 70 milliGRAM(s)/deciliter  HYDROmorphone  Injectable 0.5 milliGRAM(s) IV Push every 3 hours PRN Breakthrough pain  magnesium hydroxide Suspension 30 milliLiter(s) Oral daily PRN Constipation  ondansetron Injectable 4 milliGRAM(s) IV Push every 6 hours PRN Nausea and/or Vomiting  oxyCODONE    IR 5 milliGRAM(s) Oral every 3 hours PRN Moderate Pain (4 - 6)  oxyCODONE    IR 10 milliGRAM(s) Oral every 3 hours PRN Severe Pain (7 - 10)      PE:  Constitutional: NAD, elderly woman   HEENT: NC/AT  Back: no tenderness  Respiratory: respirations even and non labored, LCTA- diminished   Cardiovascular: S1S2 regular, no murmurs  Abdomen: soft, not tender, not distended, positive BS  Genitourinary: voiding  Musculoskeletal: no muscle tenderness, no joint swelling or tenderness- left knee ace bandage   Extremities: no pedal edema   Neurological: no focal deficits    LABS:      05-29    133[L]  |  104  |  18  ----------------------------<  146[H]  3.9   |  21[L]  |  0.86    Ca    8.8      29 May 2025 07:59                                  10.7   9.59  )-----------( 261      ( 29 May 2025 07:59 )             34.2

## 2025-05-29 NOTE — DISCHARGE NOTE NURSING/CASE MANAGEMENT/SOCIAL WORK - PATIENT PORTAL LINK FT
You can access the FollowMyHealth Patient Portal offered by [facility] by registering at the following website: [email or web address]. By joining [facility]’s FollowMyHealth portal, you will also be able to view your health information using other applications (apps) compatible with our system.

## 2025-05-29 NOTE — PROGRESS NOTE ADULT - ASSESSMENT
IMPRESSION:      70 yo woman with history of glaucoma, HLD, T2DM, GI ulcer, hypothyroid, IBS, history of PE, bilateral knee OA - had right TKR in 2024 and did well. She is now  s/p left  TKA [facility] Dr. Singh.       *OA left knee   * s/p left TKA   POD#1  monitor VS   neurovascular checks   pain regimen PRN  Ice, elevate and rest   PT/OTeval  Bowel regimen  IVF hydration   C/W prophylactic ABT   diet as tolerated   PPI  VTE prophylaxis  monitor CBC/BMP  encourage IS  MRSA/MSSA positive on PST- patient will be on Vanco and Ancef, dc on extended Bactrim as per recs     *T2DM  - A1c 6.8  - on metformin and jardiance- hold for now  - ISS    *hypothyroid  - levothyroxine    *chronic BL edema  - on torsemide- hold for now     * Gastric Ulcer  - PPI    *IBS- on dicyclomine    *glaucoma  - c/w eye drops   - alphagan/ dorzolamide- timolol    * HLD- statin     * VTE prophylaxis  Venodynes  INC mobility  CAPRINI score - 12  Xarelto when cleared with Ortho       Thank you for the consult, will follow.

## 2025-05-29 NOTE — DISCHARGE NOTE NURSING/CASE MANAGEMENT/SOCIAL WORK - FINANCIAL ASSISTANCE
[facility] provides services at a reduced cost to those who are determined to be eligible through [facility]’s financial assistance program. Information regarding [facility]’s financial assistance program can be found by going to [email or web address] or by calling [phone].

## 2025-05-30 ENCOUNTER — RX RENEWAL (OUTPATIENT)
Age: 70
End: 2025-05-30

## 2025-05-30 ENCOUNTER — TRANSCRIPTION ENCOUNTER (OUTPATIENT)
Age: 70
End: 2025-05-30

## 2025-06-02 ENCOUNTER — TRANSCRIPTION ENCOUNTER (OUTPATIENT)
Age: 70
End: 2025-06-02

## 2025-06-02 LAB — SURGICAL PATHOLOGY STUDY: SIGNIFICANT CHANGE UP

## 2025-06-04 DIAGNOSIS — E11.9 TYPE 2 DIABETES MELLITUS WITHOUT COMPLICATIONS: ICD-10-CM

## 2025-06-04 DIAGNOSIS — Z86.711 PERSONAL HISTORY OF PULMONARY EMBOLISM: ICD-10-CM

## 2025-06-04 DIAGNOSIS — K25.9 GASTRIC ULCER, UNSPECIFIED AS ACUTE OR CHRONIC, WITHOUT HEMORRHAGE OR PERFORATION: ICD-10-CM

## 2025-06-04 DIAGNOSIS — J45.909 UNSPECIFIED ASTHMA, UNCOMPLICATED: ICD-10-CM

## 2025-06-04 DIAGNOSIS — Z79.84 LONG TERM (CURRENT) USE OF ORAL HYPOGLYCEMIC DRUGS: ICD-10-CM

## 2025-06-04 DIAGNOSIS — E78.5 HYPERLIPIDEMIA, UNSPECIFIED: ICD-10-CM

## 2025-06-04 DIAGNOSIS — E03.9 HYPOTHYROIDISM, UNSPECIFIED: ICD-10-CM

## 2025-06-04 DIAGNOSIS — Z79.890 HORMONE REPLACEMENT THERAPY: ICD-10-CM

## 2025-06-04 DIAGNOSIS — H42 GLAUCOMA IN DISEASES CLASSIFIED ELSEWHERE: ICD-10-CM

## 2025-06-04 DIAGNOSIS — H40.9 UNSPECIFIED GLAUCOMA: ICD-10-CM

## 2025-06-04 DIAGNOSIS — Z96.651 PRESENCE OF RIGHT ARTIFICIAL KNEE JOINT: ICD-10-CM

## 2025-06-04 DIAGNOSIS — E11.39 TYPE 2 DIABETES MELLITUS WITH OTHER DIABETIC OPHTHALMIC COMPLICATION: ICD-10-CM

## 2025-06-04 DIAGNOSIS — M17.12 UNILATERAL PRIMARY OSTEOARTHRITIS, LEFT KNEE: ICD-10-CM

## 2025-06-04 DIAGNOSIS — K21.9 GASTRO-ESOPHAGEAL REFLUX DISEASE WITHOUT ESOPHAGITIS: ICD-10-CM

## 2025-06-04 DIAGNOSIS — K58.9 IRRITABLE BOWEL SYNDROME, UNSPECIFIED: ICD-10-CM

## 2025-06-05 DIAGNOSIS — Z96.652 PRESENCE OF LEFT ARTIFICIAL KNEE JOINT: ICD-10-CM

## 2025-06-10 ENCOUNTER — TRANSCRIPTION ENCOUNTER (OUTPATIENT)
Age: 70
End: 2025-06-10

## 2025-06-13 RX ORDER — OXYCODONE 5 MG/1
5 TABLET ORAL
Qty: 28 | Refills: 0 | Status: ACTIVE | COMMUNITY
Start: 2025-06-05 | End: 1900-01-01

## 2025-06-17 ENCOUNTER — APPOINTMENT (OUTPATIENT)
Dept: ORTHOPEDIC SURGERY | Facility: CLINIC | Age: 70
End: 2025-06-17
Payer: MEDICAID

## 2025-06-17 PROBLEM — T84.84XD PAIN DUE TO TOTAL RIGHT KNEE REPLACEMENT, SUBSEQUENT ENCOUNTER: Status: ACTIVE | Noted: 2025-06-17

## 2025-06-17 PROCEDURE — 99213 OFFICE O/P EST LOW 20 MIN: CPT | Mod: 24

## 2025-06-24 ENCOUNTER — TRANSCRIPTION ENCOUNTER (OUTPATIENT)
Age: 70
End: 2025-06-24

## 2025-07-01 ENCOUNTER — APPOINTMENT (OUTPATIENT)
Dept: ORTHOPEDIC SURGERY | Facility: CLINIC | Age: 70
End: 2025-07-01
Payer: MEDICAID

## 2025-07-01 PROCEDURE — 99024 POSTOP FOLLOW-UP VISIT: CPT

## 2025-07-01 PROCEDURE — 73562 X-RAY EXAM OF KNEE 3: CPT | Mod: LT

## 2025-07-01 RX ORDER — TRAMADOL HYDROCHLORIDE 50 MG/1
50 TABLET, COATED ORAL
Qty: 28 | Refills: 0 | Status: ACTIVE | COMMUNITY
Start: 2025-07-01 | End: 1900-01-01

## 2025-07-07 RX ORDER — CELECOXIB 200 MG/1
200 CAPSULE ORAL TWICE DAILY
Qty: 60 | Refills: 1 | Status: ACTIVE | COMMUNITY
Start: 2025-07-07 | End: 1900-01-01

## 2025-07-10 ENCOUNTER — APPOINTMENT (OUTPATIENT)
Dept: ENDOCRINOLOGY | Facility: CLINIC | Age: 70
End: 2025-07-10
Payer: MEDICAID

## 2025-07-10 PROCEDURE — G2211 COMPLEX E/M VISIT ADD ON: CPT | Mod: NC,95

## 2025-07-10 PROCEDURE — 99214 OFFICE O/P EST MOD 30 MIN: CPT | Mod: 95

## 2025-07-11 PROBLEM — E66.811 CLASS 1 OBESITY DUE TO EXCESS CALORIES WITH BODY MASS INDEX (BMI) OF 32.0 TO 32.9 IN ADULT, UNSPECIFIED WHETHER SERIOUS COMORBIDITY PRESENT: Status: ACTIVE | Noted: 2023-11-01

## 2025-07-16 LAB
ESTIMATED AVERAGE GLUCOSE: 140 MG/DL
HBA1C MFR BLD HPLC: 6.5 %

## 2025-07-28 ENCOUNTER — RX RENEWAL (OUTPATIENT)
Age: 70
End: 2025-07-28

## 2025-08-05 ENCOUNTER — APPOINTMENT (OUTPATIENT)
Dept: ORTHOPEDIC SURGERY | Facility: CLINIC | Age: 70
End: 2025-08-05
Payer: MEDICAID

## 2025-08-05 VITALS
DIASTOLIC BLOOD PRESSURE: 54 MMHG | SYSTOLIC BLOOD PRESSURE: 99 MMHG | BODY MASS INDEX: 40.94 KG/M2 | HEIGHT: 56 IN | WEIGHT: 182 LBS | HEART RATE: 79 BPM

## 2025-08-05 DIAGNOSIS — Z96.651 PRESENCE OF RIGHT ARTIFICIAL KNEE JOINT: ICD-10-CM

## 2025-08-05 DIAGNOSIS — Z96.652 PRESENCE OF LEFT ARTIFICIAL KNEE JOINT: ICD-10-CM

## 2025-08-05 PROCEDURE — 99024 POSTOP FOLLOW-UP VISIT: CPT

## 2025-08-05 PROCEDURE — 73562 X-RAY EXAM OF KNEE 3: CPT | Mod: LT

## 2025-08-05 RX ORDER — TRAMADOL HYDROCHLORIDE 50 MG/1
50 TABLET, COATED ORAL
Qty: 28 | Refills: 0 | Status: ACTIVE | COMMUNITY
Start: 2025-08-05 | End: 1900-01-01

## 2025-08-13 ENCOUNTER — APPOINTMENT (OUTPATIENT)
Dept: OPHTHALMOLOGY | Facility: CLINIC | Age: 70
End: 2025-08-13

## 2025-08-25 ENCOUNTER — RX RENEWAL (OUTPATIENT)
Age: 70
End: 2025-08-25